# Patient Record
Sex: FEMALE | Race: WHITE | NOT HISPANIC OR LATINO | Employment: FULL TIME | URBAN - METROPOLITAN AREA
[De-identification: names, ages, dates, MRNs, and addresses within clinical notes are randomized per-mention and may not be internally consistent; named-entity substitution may affect disease eponyms.]

---

## 2022-11-21 ENCOUNTER — OFFICE VISIT (OUTPATIENT)
Dept: INTERNAL MEDICINE | Facility: CLINIC | Age: 32
End: 2022-11-21

## 2022-11-21 ENCOUNTER — LAB (OUTPATIENT)
Dept: LAB | Facility: HOSPITAL | Age: 32
End: 2022-11-21

## 2022-11-21 VITALS
DIASTOLIC BLOOD PRESSURE: 62 MMHG | OXYGEN SATURATION: 99 % | HEART RATE: 73 BPM | TEMPERATURE: 97.1 F | BODY MASS INDEX: 25.4 KG/M2 | WEIGHT: 138 LBS | HEIGHT: 62 IN | SYSTOLIC BLOOD PRESSURE: 110 MMHG

## 2022-11-21 DIAGNOSIS — Z23 NEED FOR VACCINATION: ICD-10-CM

## 2022-11-21 DIAGNOSIS — Z34.90 ENCOUNTER FOR PRENATAL CARE: Primary | ICD-10-CM

## 2022-11-21 DIAGNOSIS — Z00.00 HEALTHCARE MAINTENANCE: ICD-10-CM

## 2022-11-21 DIAGNOSIS — F34.1 PERSISTENT DEPRESSIVE DISORDER: ICD-10-CM

## 2022-11-21 DIAGNOSIS — Z01.419 WOMEN'S ANNUAL ROUTINE GYNECOLOGICAL EXAMINATION: ICD-10-CM

## 2022-11-21 DIAGNOSIS — R63.5 WEIGHT GAIN: ICD-10-CM

## 2022-11-21 DIAGNOSIS — Z23 NEED FOR INFLUENZA VACCINATION: ICD-10-CM

## 2022-11-21 LAB
DEPRECATED RDW RBC AUTO: 39 FL (ref 37–54)
ERYTHROCYTE [DISTWIDTH] IN BLOOD BY AUTOMATED COUNT: 12.5 % (ref 12.3–15.4)
HBA1C MFR BLD: 4.9 % (ref 4.8–5.6)
HCT VFR BLD AUTO: 42.6 % (ref 34–46.6)
HGB BLD-MCNC: 14.6 G/DL (ref 12–15.9)
MCH RBC QN AUTO: 29.4 PG (ref 26.6–33)
MCHC RBC AUTO-ENTMCNC: 34.3 G/DL (ref 31.5–35.7)
MCV RBC AUTO: 85.9 FL (ref 79–97)
PLATELET # BLD AUTO: 300 10*3/MM3 (ref 140–450)
PMV BLD AUTO: 9.7 FL (ref 6–12)
RBC # BLD AUTO: 4.96 10*6/MM3 (ref 3.77–5.28)
WBC NRBC COR # BLD: 7.84 10*3/MM3 (ref 3.4–10.8)

## 2022-11-21 PROCEDURE — 86803 HEPATITIS C AB TEST: CPT

## 2022-11-21 PROCEDURE — 99204 OFFICE O/P NEW MOD 45 MIN: CPT | Performed by: INTERNAL MEDICINE

## 2022-11-21 PROCEDURE — 83036 HEMOGLOBIN GLYCOSYLATED A1C: CPT

## 2022-11-21 PROCEDURE — 80050 GENERAL HEALTH PANEL: CPT

## 2022-11-21 PROCEDURE — 90471 IMMUNIZATION ADMIN: CPT | Performed by: INTERNAL MEDICINE

## 2022-11-21 PROCEDURE — 90686 IIV4 VACC NO PRSV 0.5 ML IM: CPT | Performed by: INTERNAL MEDICINE

## 2022-11-21 PROCEDURE — 84703 CHORIONIC GONADOTROPIN ASSAY: CPT

## 2022-11-21 PROCEDURE — 36415 COLL VENOUS BLD VENIPUNCTURE: CPT

## 2022-11-21 RX ORDER — DROSPIRENONE AND ETHINYL ESTRADIOL 0.02-3(28)
1 KIT ORAL DAILY
COMMUNITY
Start: 2022-08-15 | End: 2023-02-12

## 2022-11-21 RX ORDER — SERTRALINE HYDROCHLORIDE 100 MG/1
100 TABLET, FILM COATED ORAL DAILY
COMMUNITY
End: 2022-11-21 | Stop reason: SDUPTHER

## 2022-11-21 RX ORDER — SERTRALINE HYDROCHLORIDE 100 MG/1
100 TABLET, FILM COATED ORAL DAILY
Qty: 90 TABLET | Refills: 1 | Status: CANCELLED | OUTPATIENT
Start: 2022-11-21

## 2022-11-21 RX ORDER — SERTRALINE HYDROCHLORIDE 100 MG/1
100 TABLET, FILM COATED ORAL DAILY
Qty: 90 TABLET | Refills: 1 | Status: SHIPPED | OUTPATIENT
Start: 2022-11-21 | End: 2023-02-12

## 2022-11-21 NOTE — PROGRESS NOTES
New Patient Office Visit      Date: 2022   Patient Name: Magdalena Love  : 1990   MRN: [id number]     Chief Complaint:    Chief Complaint   Patient presents with   • Contraception     She has just stopped her birth control after being on it for 15 years.  She would like her hormones checked.   • Anxiety     Needs a refill on zoloft   • Depression   • Med Refill   • Establish Care     New patient   • weight gain     She has gained 10 lbs in the last 6 months.  She would like to have her thyroid checked.       History of Present Illness: Magdalena Bates is a 32 y.o. female who is here today to establish care.       She is recently  and her name is Magdalena Love    She and her  are ready to try to conceive. Patient took her last birth control pill last night.  She takes collagen daily and has prenatal vitamins at home that she has not started.  Her menstrual cycles have been very light for 5 years.  Patient is a  rep and also a [employer] .  Her normal weight has been 128 pounds, but at the beginning of 2022 she was 138 pounds with no change in her diet, and she works out 5 to 6 times per week. She has never gained that much weight.   She reports a family history of thyroid disease.     She started taking Zoloft in 2018 for depression and anxiety. Currently, she takes 100 mg for anxiety.     Her last Pap smear was probably 3 years ago. She would like a referral to an OB-GYN.   She reports having high cholesterol inherited from her father.  She is unsure if she has been screened for hepatitis C or HIV.   She had Lasix surgery done not too long ago.   Patient has not received her influenza vaccine this year and will get that today.    She is fully vaccinated for COVID-19. The patient declines the bivalent booster.   Patient thinks she had a tetanus vaccine last year and will check on that.     Subjective      Review of Systems:   Review of Systems  "  Constitutional: Positive for unexpected weight gain. Negative for activity change and appetite change.   HENT: Negative for dental problem.    Eyes: Negative for visual disturbance.   Genitourinary: Negative for menstrual problem.   Neurological: Negative for weakness.   Psychiatric/Behavioral: The patient is nervous/anxious.        Past Medical History:   Past Medical History:   Diagnosis Date   • Anxiety    • Depression    • Fainting        Past Surgical History:   Past Surgical History:   Procedure Laterality Date   • MASTOPEXY Bilateral 10/2015    [place], [place]   • WISDOM TOOTH EXTRACTION  2011    [place], [place]       Family History:   Family History   Problem Relation Age of Onset   • Hyperlipidemia Father    • Dementia Maternal Grandfather    • Epilepsy Paternal Grandmother        Social History:   Social History     Socioeconomic History   • Marital status:    Tobacco Use   • Smoking status: Never   • Smokeless tobacco: Never   Vaping Use   • Vaping Use: Never used   Substance and Sexual Activity   • Alcohol use: Yes     Alcohol/week: 1.0 standard drink     Types: 1 Standard drinks or equivalent per week   • Drug use: Never   • Sexual activity: Never       Medications:     Current Outpatient Medications:   •  drospirenone-ethinyl estradiol (JUANA,GIANVI) 3-0.02 MG per tablet, 1 tablet Daily., Disp: , Rfl:   •  sertraline (Zoloft) 100 MG tablet, Take 1 tablet by mouth Daily., Disp: 90 tablet, Rfl: 1    Allergies:   No Known Allergies    Objective     Physical Exam:  Vital Signs:   Vitals:    11/21/22 1056   BP: 110/62   BP Location: Right arm   Patient Position: Sitting   Pulse: 73   Temp: 97.1 °F (36.2 °C)   TempSrc: Infrared   SpO2: 99%   Weight: 62.6 kg (138 lb)   Height: 156.5 cm (61.6\")     Body mass index is 25.57 kg/m².  BMI is >= 25 and <30. (Overweight) The following options were offered after discussion;: pt is trying to get pregnant; weight loss is not encouraged       Physical Exam  Vitals and " nursing note reviewed.   Constitutional:       General: She is not in acute distress.     Appearance: Normal appearance. She is well-developed and well-groomed. She is not ill-appearing or toxic-appearing.   HENT:      Head: Normocephalic and atraumatic.      Right Ear: Tympanic membrane, ear canal and external ear normal.      Left Ear: Tympanic membrane, ear canal and external ear normal.      Nose: Nose normal.      Mouth/Throat:      Mouth: Mucous membranes are moist.      Pharynx: Oropharynx is clear.   Eyes:      Conjunctiva/sclera: Conjunctivae normal.      Pupils: Pupils are equal, round, and reactive to light.   Cardiovascular:      Rate and Rhythm: Normal rate and regular rhythm.      Heart sounds: Normal heart sounds.      Comments: Faint 1/6 TUAN  Pulmonary:      Effort: Pulmonary effort is normal. No respiratory distress.      Breath sounds: Normal breath sounds. No wheezing, rhonchi or rales.   Abdominal:      General: Bowel sounds are normal. There is no distension.      Palpations: Abdomen is soft.      Tenderness: There is no abdominal tenderness. There is no guarding or rebound.   Musculoskeletal:      Cervical back: Neck supple.   Lymphadenopathy:      Cervical: No cervical adenopathy.   Skin:     General: Skin is warm and dry.   Neurological:      General: No focal deficit present.      Mental Status: She is alert and oriented to person, place, and time. Mental status is at baseline.      Motor: No weakness.      Gait: Gait normal.      Deep Tendon Reflexes: Reflexes normal.   Psychiatric:         Attention and Perception: Attention normal.         Mood and Affect: Mood normal.         Behavior: Behavior normal. Behavior is cooperative.         Thought Content: Thought content normal.         Judgment: Judgment normal.         Procedures    Results:     Labs:   Hemoglobin A1C   Date Value Ref Range Status   04/20/2022 4.6 <5.7 % Final        No results found for this or any previous visit.      Imaging:   No Images in the past 120 days found..     Assessment / Plan      Assessment/Plan:   Diagnoses and all orders for this visit:    1. Encounter for prenatal care (Primary)    2. Weight gain  -     TSH Rfx On Abnormal To Free T4; Future    3. Women's annual routine gynecological examination  -     Ambulatory Referral to Obstetrics / Gynecology      4. Depression    5. Healthcare maintenance  -     TSH Rfx On Abnormal To Free T4; Future  -     Comprehensive Metabolic Panel; Future  -     Hemoglobin A1c; Future  -     CBC (No Diff); Future  -     Hepatitis C Antibody; Future  -     hCG, Serum, Qualitative; Future    6. Need for vaccination  -     FluLaval/Fluzone >6 mos ([id number])    7. Need for influenza vaccination    1. Prenatal care  - Patient trying to conceive   - counseled to start prenatal vitamins today and take daily while trying to conceive and while pregnant  - counseled to abstain from alcohol and maintain a healthy diet and exercise routine.   - Referral to OB-GYN for Pap and pregnancy.   - Counseled to discuss continuing sertraline while trying to conceive/pregnant with OB/GYN  - Check a pregnancy test with labs.     2. Unexpected weight gain  - counseled will check TSH     3. Faint 1/6 heart murmur   - Pt declines echo at this time; she has an intense exercise routine w/o any difficulties  - Patient had extensive workup as a teenager that was negative    4. Anxiety  - refilled sertraline 100mg qday  - counseled to discuss continuing sertraline while trying to conceive/pregnancy with OB/GYN     5. Preventive Healthcare   - Referral to OB-GYN for Pap smear.   - Mammograms start at age 40, unless there is a change in family history.   - Colon cancer screening at age 45 unless family history changes.  - influenza vaccine today   - Patient thinks she had a tetanus last year and will check on that.  - She is up to date on COVID-19 vaccines and declines the COVID-19 bivalent booster.          Follow Up:   Return in about 1 year (around 11/21/2023).    I have spent a total of 45 min on reviewing test results/preparing to see patient, counseling patient, performing medically appropriate exam and documenting clinical information in the electronic or other health record    Bebe Gleason MD. [facility]     Transcribed from ambient dictation for Bebe Gleason MD by Anay Rose.  11/21/22   13:50 EST    Patient verbalized consent to the visit recording.  I have personally performed the services described in this document as transcribed by the above individual, and it is both accurate and complete.

## 2022-11-22 LAB
ALBUMIN SERPL-MCNC: 4.3 G/DL (ref 3.5–5.2)
ALBUMIN/GLOB SERPL: 1.4 G/DL
ALP SERPL-CCNC: 56 U/L (ref 39–117)
ALT SERPL W P-5'-P-CCNC: 19 U/L (ref 1–33)
ANION GAP SERPL CALCULATED.3IONS-SCNC: 11.5 MMOL/L (ref 5–15)
AST SERPL-CCNC: 23 U/L (ref 1–32)
BILIRUB SERPL-MCNC: <0.2 MG/DL (ref 0–1.2)
BUN SERPL-MCNC: 16 MG/DL (ref 6–20)
BUN/CREAT SERPL: 22.9 (ref 7–25)
CALCIUM SPEC-SCNC: 9.7 MG/DL (ref 8.6–10.5)
CHLORIDE SERPL-SCNC: 102 MMOL/L (ref 98–107)
CO2 SERPL-SCNC: 23.5 MMOL/L (ref 22–29)
CREAT SERPL-MCNC: 0.7 MG/DL (ref 0.57–1)
EGFRCR SERPLBLD CKD-EPI 2021: 119.5 ML/MIN/1.73
GLOBULIN UR ELPH-MCNC: 3 GM/DL
GLUCOSE SERPL-MCNC: 79 MG/DL (ref 65–99)
HCG SERPL QL: NEGATIVE
HCV AB SER DONR QL: NORMAL
POTASSIUM SERPL-SCNC: 4.1 MMOL/L (ref 3.5–5.2)
PROT SERPL-MCNC: 7.3 G/DL (ref 6–8.5)
SODIUM SERPL-SCNC: 137 MMOL/L (ref 136–145)
TSH SERPL DL<=0.05 MIU/L-ACNC: 1.72 UIU/ML (ref 0.27–4.2)

## 2022-11-23 ENCOUNTER — PATIENT ROUNDING (BHMG ONLY) (OUTPATIENT)
Dept: INTERNAL MEDICINE | Facility: CLINIC | Age: 32
End: 2022-11-23

## 2022-11-23 NOTE — PROGRESS NOTES
A Backplane message has been sent to the patient for patient rounding with [facility].

## 2022-11-23 NOTE — PROGRESS NOTES
Ms. Love,    It was nice to meet you this week.    All of your labs checked on 11/21/22 are normal, including your thyroid lab test.     Thank you,  Dr. Gleason

## 2023-02-12 ENCOUNTER — TELEMEDICINE (OUTPATIENT)
Dept: FAMILY MEDICINE CLINIC | Facility: TELEHEALTH | Age: 33
End: 2023-02-12
Payer: COMMERCIAL

## 2023-02-12 DIAGNOSIS — J06.9 VIRAL UPPER RESPIRATORY TRACT INFECTION: Primary | ICD-10-CM

## 2023-02-12 PROBLEM — F32.9 REACTIVE DEPRESSION: Status: ACTIVE | Noted: 2019-03-28

## 2023-02-12 PROBLEM — F41.9 ANXIETY: Status: ACTIVE | Noted: 2022-04-20

## 2023-02-12 PROBLEM — L29.2 VULVAR ITCHING: Status: ACTIVE | Noted: 2018-07-12

## 2023-02-12 PROBLEM — Z01.419 WELL WOMAN EXAM WITH ROUTINE GYNECOLOGICAL EXAM: Status: ACTIVE | Noted: 2019-03-28

## 2023-02-12 PROCEDURE — 99213 OFFICE O/P EST LOW 20 MIN: CPT | Performed by: NURSE PRACTITIONER

## 2023-02-12 RX ORDER — METHYLPREDNISOLONE 4 MG/1
TABLET ORAL
Qty: 21 TABLET | Refills: 0 | Status: SHIPPED | OUTPATIENT
Start: 2023-02-12

## 2023-02-12 NOTE — PROGRESS NOTES
CHIEF COMPLAINT  Chief Complaint   Patient presents with   • Sinusitis   • Nasal Congestion   • Cough         HPI  Magdalena Love is a 32 y.o. female  presents with complaint of head congestion and sinus pressure since Thursday 2/9/2023.   Taking Dayquil and Nyquil along with sudafed. Ask about Mucinex D    Review of Systems   Constitutional: Positive for fatigue. Negative for chills, diaphoresis and fever.   HENT: Positive for congestion, postnasal drip, rhinorrhea and sinus pressure.    Respiratory: Positive for cough. Negative for chest tightness, shortness of breath and wheezing.    Cardiovascular: Negative for chest pain.   Gastrointestinal: Negative for diarrhea, nausea and vomiting.   Musculoskeletal: Negative for myalgias.   Neurological: Positive for headaches.       Past Medical History:   Diagnosis Date   • Anxiety    • Depression    • Fainting        Family History   Problem Relation Age of Onset   • Hyperlipidemia Father    • Dementia Maternal Grandfather    • Epilepsy Paternal Grandmother        Social History     Socioeconomic History   • Marital status:    Tobacco Use   • Smoking status: Never     Passive exposure: Never   • Smokeless tobacco: Never   Vaping Use   • Vaping Use: Never used   Substance and Sexual Activity   • Alcohol use: Yes     Alcohol/week: 1.0 standard drink     Types: 1 Standard drinks or equivalent per week   • Drug use: Never   • Sexual activity: Never       Magdalena Love  reports that she has never smoked. She has never been exposed to tobacco smoke. She has never used smokeless tobacco.       LMP 02/06/2023 (Approximate)   Breastfeeding No     PHYSICAL EXAM  Physical Exam   Constitutional: She is oriented to person, place, and time. She appears well-developed and well-nourished. She does not have a sickly appearance. She does not appear ill. No distress.   HENT:   Head: Normocephalic and atraumatic.   Nose: Rhinorrhea and congestion present.   Eyes: EOM are normal.    Neck: Neck normal appearance.  Pulmonary/Chest: Effort normal.  No respiratory distress.  Neurological: She is alert and oriented to person, place, and time.   Skin: Skin is dry.   Psychiatric: She has a normal mood and affect.         Diagnoses and all orders for this visit:    1. Viral upper respiratory tract infection (Primary)    Discussed likely viral and antibiotics are not effective treatment   Rest, fluids and over the counter medications are okay.   Stop Sudafed if taking Dayquil and Nyquil.   Do not start Muxinex D or fastmax or multisymptom relief if taking Dayquil and Nyquil, but can take Mucinex DM if cough worsens   Recommend COVID-19 testing.     The use of a video visit has been reviewed with the patient and verbal informed consent has been obtained. Myself and Magdalena Love participated in this visit. The patient is located in [address] [place] [address]. I am located in [place], [place]. Mychart and Twilio were utilized.       Note Disclaimer: At [facility], we believe that sharing information builds trust and better   relationships. You are receiving this note because you recently visited [facility]. It is possible you   will see health information before a provider has talked with you about it. This kind of information can   be easy to misunderstand. To help you fully understand what it means for your health, we urge you to   discuss this note with your provider.    Harriet Marquez, DIXON  02/12/2023  09:52 EST

## 2023-02-12 NOTE — PATIENT INSTRUCTIONS
Drink plenty of water  Over the counter pain relievers okay   If symptoms do not improve in 5-7 days follow up with your primary care provider or urgent care  Discussed likely viral and antibiotics are not effective treatment   Rest, fluids and over the counter medications are okay.   Discussed likely viral and antibiotics are not effective treatment   Rest, fluids and over the counter medications are okay.   Stop Sudafed if taking Dayquil and Nyquil.   Do not start Muxinex D or fastmax or multisymptom relief if taking Dayquil and Nyquil, but can take Mucinex DM for cough   Recommend COVID-19 testing.     Upper Respiratory Infection, Adult  An upper respiratory infection (URI) is a common viral infection of the nose, throat, and upper air passages that lead to the lungs. The most common type of URI is the common cold. URIs usually get better on their own, without medical treatment.  What are the causes?  A URI is caused by a virus. You may catch a virus by:  Breathing in droplets from an infected person's cough or sneeze.  Touching something that has been exposed to the virus (is contaminated) and then touching your mouth, nose, or eyes.  What increases the risk?  You are more likely to get a URI if:  You are very young or very old.  You have close contact with others, such as at work, school, or a health care facility.  You smoke.  You have long-term (chronic) heart or lung disease.  You have a weakened disease-fighting system (immune system).  You have nasal allergies or asthma.  You are experiencing a lot of stress.  You have poor nutrition.  What are the signs or symptoms?  A URI usually involves some of the following symptoms:  Runny or stuffy (congested) nose.  Cough.  Sneezing.  Sore throat.  Headache.  Fatigue.  Fever.  Loss of appetite.  Pain in your forehead, behind your eyes, and over your cheekbones (sinus pain).  Muscle aches.  Redness or irritation of the eyes.  Pressure in the ears or face.  How is  this diagnosed?  This condition may be diagnosed based on your medical history and symptoms, and a physical exam. Your health care provider may use a swab to take a mucus sample from your nose (nasal swab). This sample can be tested to determine what virus is causing the illness.  How is this treated?  URIs usually get better on their own within 7-10 days. Medicines cannot cure URIs, but your health care provider may recommend certain medicines to help relieve symptoms, such as:  Over-the-counter cold medicines.  Cough suppressants. Coughing is a type of defense against infection that helps to clear the respiratory system, so take these medicines only as recommended by your health care provider.  Fever-reducing medicines.  Follow these instructions at home:  Activity  Rest as needed.  If you have a fever, stay home from work or school until your fever is gone or until your health care provider says your URI cannot spread to other people (is no longer contagious). Your health care provider may have you wear a face mask to prevent your infection from spreading.  Relieving symptoms  Gargle with a mixture of salt and water 3-4 times a day or as needed. To make salt water, completely dissolve ½-1 tsp (3-6 g) of salt in 1 cup (237 mL) of warm water.  Use a cool-mist humidifier to add moisture to the air. This can help you breathe more easily.  Eating and drinking    Drink enough fluid to keep your urine pale yellow.  Eat soups and other clear broths.  General instructions    Take over-the-counter and prescription medicines only as told by your health care provider. These include cold medicines, fever reducers, and cough suppressants.  Do not use any products that contain nicotine or tobacco. These products include cigarettes, chewing tobacco, and vaping devices, such as e-cigarettes. If you need help quitting, ask your health care provider.  Stay away from secondhand smoke.  Stay up to date on all immunizations, including  the yearly (annual) flu vaccine.  Keep all follow-up visits. This is important.  How to prevent the spread of infection to others  URIs can be contagious. To prevent the infection from spreading:  Wash your hands with soap and water for at least 20 seconds. If soap and water are not available, use hand .  Avoid touching your mouth, face, eyes, or nose.  Cough or sneeze into a tissue or your sleeve or elbow instead of into your hand or into the air.    Contact a health care provider if:  You are getting worse instead of better.  You have a fever or chills.  Your mucus is brown or red.  You have yellow or brown discharge coming from your nose.  You have pain in your face, especially when you bend forward.  You have swollen neck glands.  You have pain while swallowing.  You have white areas in the back of your throat.  Get help right away if:  You have shortness of breath that gets worse.  You have severe or persistent:  Headache.  Ear pain.  Sinus pain.  Chest pain.  You have chronic lung disease along with any of the following:  Making high-pitched whistling sounds when you breathe, most often when you breathe out (wheezing).  Prolonged cough (more than 14 days).  Coughing up blood.  A change in your usual mucus.  You have a stiff neck.  You have changes in your:  Vision.  Hearing.  Thinking.  Mood.  These symptoms may be an emergency. Get help right away. Call 911.  Do not wait to see if the symptoms will go away.  Do not drive yourself to the hospital.  Summary  An upper respiratory infection (URI) is a common infection of the nose, throat, and upper air passages that lead to the lungs.  A URI is caused by a virus.  URIs usually get better on their own within 7-10 days.  Medicines cannot cure URIs, but your health care provider may recommend certain medicines to help relieve symptoms.  This information is not intended to replace advice given to you by your health care provider. Make sure you discuss any  questions you have with your health care provider.  Document Revised: 07/20/2022 Document Reviewed: 07/20/2022  Elsevier Patient Education © 2022 [employer]

## 2023-04-27 ENCOUNTER — PATIENT ROUNDING (BHMG ONLY) (OUTPATIENT)
Dept: OBSTETRICS AND GYNECOLOGY | Facility: CLINIC | Age: 33
End: 2023-04-27
Payer: COMMERCIAL

## 2023-04-27 ENCOUNTER — OFFICE VISIT (OUTPATIENT)
Dept: OBSTETRICS AND GYNECOLOGY | Facility: CLINIC | Age: 33
End: 2023-04-27
Payer: COMMERCIAL

## 2023-04-27 VITALS
SYSTOLIC BLOOD PRESSURE: 110 MMHG | HEIGHT: 62 IN | BODY MASS INDEX: 25.98 KG/M2 | DIASTOLIC BLOOD PRESSURE: 68 MMHG | WEIGHT: 141.2 LBS

## 2023-04-27 DIAGNOSIS — L65.9 HAIR LOSS: ICD-10-CM

## 2023-04-27 DIAGNOSIS — Z01.419 WELL WOMAN EXAM WITH ROUTINE GYNECOLOGICAL EXAM: Primary | ICD-10-CM

## 2023-04-27 RX ORDER — PRENATAL VIT NO.126/IRON/FOLIC 28MG-0.8MG
TABLET ORAL DAILY
COMMUNITY

## 2023-04-27 NOTE — PROGRESS NOTES
Gynecologic Annual Exam Note        Gynecologic Exam        Subjective     HPI  Magdalena Love is a 32 y.o. No obstetric history on file. female who presents for annual well woman exam as a new patient.Patient's last menstrual period was 04/01/2023 (exact date).. Her periods occur every 25-35 days , lasting 6 days. The flow is moderate to heavy.. She reports dysmenorrhea is none. Partner Status: Marital Status: .  She is sexually active. She has not had new partners.. STD testing recommendations have been explained to the patient and she does not desire STD testing.    Pt has been trying to conceive since Nov 2022. Pt stopped her BC pills that she was taking for about 16 years since beginning of Nov2022.     Additional OB/GYN History   Current contraception: contraceptive methods: None  Desires to: do not start contraception  Thromboembolic Disease: none    History of STD: no    Last Pap : per pt over 2 years . Results: negative. HPV: unknown .   Last Completed Pap Smear     This patient has no relevant Health Maintenance data.           History of abnormal Pap smear: no  Gardasil status:completed  Family history of uterine, colon, breast, or ovarian cancer: no  Performs monthly Self-Breast Exam: no  Exercises Regularly:yes  Feelings of Anxiety or Depression: no  Tobacco Usage?: No       Current Outpatient Medications:   •  prenatal vitamin (prenatal, CLASSIC, vitamin) tablet, Take  by mouth Daily., Disp: , Rfl:      Patient denies the need for medication refills today.    OB History    No obstetric history on file.         Health Maintenance   Topic Date Due   • Annual Gynecologic Pelvic and Breast Exam  Never done   • COVID-19 Vaccine (3 - Booster for Moderna series) 03/31/2021   • ANNUAL PHYSICAL  Never done   • PAP SMEAR  Never done   • LIPID PANEL  04/27/2023   • INFLUENZA VACCINE  08/01/2023   • TDAP/TD VACCINES (2 - Td or Tdap) 02/24/2030   • HEPATITIS C SCREENING  Completed   • Pneumococcal  "Vaccine 0-64  Aged Out       Past Medical History:   Diagnosis Date   • Anxiety    • Depression    • Fainting    • Hyperlipidemia     Family history        Past Surgical History:   Procedure Laterality Date   • MASTOPEXY Bilateral 10/2015    [place], [place]   • REFRACTIVE SURGERY     • WISDOM TOOTH EXTRACTION  2011    [place], [place]       The additional following portions of the patient's history were reviewed and updated as appropriate: allergies, current medications, past family history, past medical history, past social history, past surgical history and problem list.    Review of Systems   Constitutional: Negative.    HENT: Negative.         Hair loss   Eyes: Negative.    Respiratory: Negative.    Cardiovascular: Negative.    Gastrointestinal: Negative.    Endocrine: Negative.    Genitourinary: Negative.    Musculoskeletal: Negative.    Skin: Negative.    Allergic/Immunologic: Negative.    Neurological: Negative.    Hematological: Negative.    Psychiatric/Behavioral: Negative.          I have reviewed and agree with the HPI, ROS, and historical information as entered above. Shirin Ross MD        Objective   /68   Ht 156.5 cm (61.6\")   Wt 64 kg (141 lb 3.2 oz)   LMP 04/01/2023 (Exact Date)   BMI 26.16 kg/m²     Physical Exam  Vitals and nursing note reviewed. Exam conducted with a chaperone present.   Constitutional:       Appearance: She is well-developed.   HENT:      Head: Normocephalic and atraumatic.   Neck:      Thyroid: No thyroid mass or thyromegaly.   Cardiovascular:      Rate and Rhythm: Normal rate and regular rhythm.      Heart sounds: No murmur heard.  Pulmonary:      Effort: Pulmonary effort is normal. No retractions.      Breath sounds: Normal breath sounds. No wheezing, rhonchi or rales.   Chest:      Chest wall: No mass or tenderness.   Breasts:     Right: Normal. No mass, nipple discharge, skin change or tenderness.      Left: Normal. No mass, nipple discharge, skin change or " tenderness.      Comments: Reduction scars noted  Abdominal:      General: Bowel sounds are normal.      Palpations: Abdomen is soft. Abdomen is not rigid. There is no mass.      Tenderness: There is no abdominal tenderness. There is no guarding.      Hernia: No hernia is present. There is no hernia in the left inguinal area or right inguinal area.   Genitourinary:     General: Normal vulva.      Exam position: Lithotomy position.      Pubic Area: No rash.       Labia:         Right: No rash, tenderness or lesion.         Left: No rash, tenderness or lesion.       Urethra: No urethral pain or urethral swelling.      Vagina: Normal. No vaginal discharge or lesions.      Cervix: No cervical motion tenderness, discharge, lesion or cervical bleeding.      Uterus: Normal. Not enlarged, not fixed and not tender.       Adnexa:         Right: No mass, tenderness or fullness.          Left: No mass, tenderness or fullness.        Rectum: No external hemorrhoid.   Musculoskeletal:      Cervical back: Normal range of motion. No muscular tenderness.   Neurological:      Mental Status: She is alert and oriented to person, place, and time.   Psychiatric:         Behavior: Behavior normal.            Assessment and Plan    Problem List Items Addressed This Visit        Health Encounters    Well woman exam with routine gynecological exam - Primary    Overview     Discussed current  ASCCP pap guidelines  Encourage folic acid, dietary calcium, exercise, yearly flu shot & breast awareness   RTO 1 year or PRN  Recommend pt establish My Chart & check any results there  Declines STI screen         Relevant Orders    LIQUID-BASED PAP SMEAR WITH HPV GENOTYPING REGARDLESS OF INTERPRETATION (RAZIA,COR,MAD)   Other Visit Diagnoses     Hair loss        Relevant Orders    TSH    Testosterone Free Direct          1. GYN annual well woman exam.   2. Reviewed pap guidelines.   3. Discussed preconception counseling.  Patient having regular cycles.   We discussed timed intercourse.  She is on prenatal vitamins.  She has been trying for approximately 5 to 6 months.  4. Hair loss noted since January.  She reports the roots are thick but she is having breakage of hair.  We discussed avoidance of processing hair and we will check TSH and testosterone today.  5. Recommended use of Vitamin D replacement and getting adequate calcium in her diet. (1500mg)  6. Reviewed monthly self breast exams.  Instructed to call with lumps, pain, or breast discharge.    7. Reviewed BMI and weight loss as preventative health measures.   8. Reviewed exercise as a preventative health measures.   9. Reccommended Flu Vaccine in Fall of each year.  10. RTC in 1 year or PRN with problems  Return in about 1 year (around 4/27/2024) for Annual physical.      Shirin Ross MD  04/27/2023

## 2023-04-27 NOTE — PROGRESS NOTES
April 27, 2023    Hello, may I speak with Magdalena Love?    My name is denys     I am  with [facility]   [facility] [address]  [address]  [facility] [address] [phone].    Before we get started may I verify your date of birth? 1990    I am calling to officially welcome you to our practice and ask about your recent visit. Is this a good time to talk? yes    Tell me about your visit with us. What things went well?  VERY IMPRESSED DR VALERO WAS GREAT AND EVERYTHING WAS SMOOTH WAS IN AND OUT        We're always looking for ways to make our patients' experiences even better. Do you have recommendations on ways we may improve?  no    Overall were you satisfied with your first visit to our practice? yes       I appreciate you taking the time to speak with me today. Is there anything else I can do for you? no      Thank you, and have a great day.

## 2023-04-28 LAB
REF LAB TEST METHOD: NORMAL
TESTOST FREE SERPL-MCNC: 1.2 PG/ML (ref 0–4.2)
TSH SERPL DL<=0.005 MIU/L-ACNC: 1.83 UIU/ML (ref 0.27–4.2)

## 2023-06-02 ENCOUNTER — TELEPHONE (OUTPATIENT)
Dept: OBSTETRICS AND GYNECOLOGY | Facility: CLINIC | Age: 33
End: 2023-06-02

## 2023-06-02 NOTE — TELEPHONE ENCOUNTER
Caller: Magdalena Love    Relationship: Self    Best call back number: [phone]    What is the best time to reach you: ANYTIME - LVM    Who are you requesting to speak with (clinical staff, provider,  specific staff member): DR. VALERO OR STAFF    Is it okay if the provider responds through [employer]: YES    PT IS A NEW OB PT - FIRST VISIT IS SCHEDULED FOR 7/12/23 - PT IS FLYING TO [place], [place] ON 06/21/23 - PT IS ASKING IF IT IS SAFE FOR HER TO FLY - PLEASE CALL THE PT TO LET HER KNOW    THANK YOU!

## 2023-06-02 NOTE — TELEPHONE ENCOUNTER
LMP 4/28/23. Pt states she has a flight on 6/21/23 to [place]. Educated pt there are no travel restrictions in early pregnancy, pt JASEN.

## 2023-07-12 PROBLEM — F32.9 REACTIVE DEPRESSION: Status: RESOLVED | Noted: 2019-03-28 | Resolved: 2023-07-12

## 2023-07-12 PROBLEM — F32.A ANXIETY AND DEPRESSION: Status: ACTIVE | Noted: 2022-04-20

## 2023-07-12 PROBLEM — Z01.419 WELL WOMAN EXAM WITH ROUTINE GYNECOLOGICAL EXAM: Status: RESOLVED | Noted: 2019-03-28 | Resolved: 2023-07-12

## 2023-07-12 PROBLEM — L29.2 VULVAR ITCHING: Status: RESOLVED | Noted: 2018-07-12 | Resolved: 2023-07-12

## 2023-07-12 PROBLEM — Z34.90 PRENATAL CARE, ANTEPARTUM: Status: ACTIVE | Noted: 2023-07-12

## 2023-08-08 PROBLEM — Z28.39 RUBELLA NON-IMMUNE STATUS, ANTEPARTUM: Status: ACTIVE | Noted: 2023-08-08

## 2023-08-08 PROBLEM — O09.899 RUBELLA NON-IMMUNE STATUS, ANTEPARTUM: Status: ACTIVE | Noted: 2023-08-08

## 2023-08-09 ENCOUNTER — ROUTINE PRENATAL (OUTPATIENT)
Dept: OBSTETRICS AND GYNECOLOGY | Facility: CLINIC | Age: 33
End: 2023-08-09
Payer: COMMERCIAL

## 2023-08-09 VITALS — DIASTOLIC BLOOD PRESSURE: 62 MMHG | WEIGHT: 145 LBS | SYSTOLIC BLOOD PRESSURE: 102 MMHG | BODY MASS INDEX: 26.87 KG/M2

## 2023-08-09 DIAGNOSIS — F41.9 ANXIETY AND DEPRESSION: ICD-10-CM

## 2023-08-09 DIAGNOSIS — F32.A ANXIETY AND DEPRESSION: ICD-10-CM

## 2023-08-09 DIAGNOSIS — Z34.90 PRENATAL CARE, ANTEPARTUM: Primary | ICD-10-CM

## 2023-08-09 DIAGNOSIS — Z28.39 RUBELLA NON-IMMUNE STATUS, ANTEPARTUM: ICD-10-CM

## 2023-08-09 DIAGNOSIS — O09.899 RUBELLA NON-IMMUNE STATUS, ANTEPARTUM: ICD-10-CM

## 2023-08-09 LAB
GLUCOSE UR STRIP-MCNC: NEGATIVE MG/DL
PROT UR STRIP-MCNC: NEGATIVE MG/DL

## 2023-08-09 NOTE — PROGRESS NOTES
OB FOLLOW UP  CC- Here for care of pregnancy        Magdalena Love is a 33 y.o.  13w3d patient being seen today for her obstetrical follow up visit. Patient reports heartburn (She is not taking OTC medication for treatment currently).     Her prenatal care is complicated by (and status) :   Patient Active Problem List   Diagnosis    Anxiety and depression    Prenatal care, antepartum    Rubella non-immune status, antepartum       Desires genetic testing?:  already done, neg, boy  Ultrasound Today: No    ROS -   Patient Denies: Loss of Fluid, Vaginal Spotting, Vision Changes, Headaches, Nausea , and Vomiting   Fetal Movement :  N/A  All other systems reviewed and are negative.     The additional following portions of the patient's history were reviewed and updated as appropriate: allergies, current medications, past family history, past medical history, past social history, past surgical history, and problem list.    I have reviewed and agree with the HPI, ROS, and historical information as entered above. Shirin Ross MD          /62   Wt 65.8 kg (145 lb)   LMP 2023   BMI 26.87 kg/mý         EXAM:     Prenatal Vitals  BP: 102/62  Weight: 65.8 kg (145 lb)   Fetal Heart Rate: 147          Urine Glucose Read-only: Negative  Urine Protein Read-only: Negative       Assessment and Plan    Problem List Items Addressed This Visit          Gravid and     Prenatal care, antepartum - Primary    Overview     CF DNA testing negative.  Boy!         Relevant Orders    POC Urinalysis Dipstick (Completed)    Rubella non-immune status, antepartum       Mental Health    Anxiety and depression    Overview     Controlled off meds at new OB visit.            Pregnancy at 13w3d  Labs reviewed from New OB Visit.  Counseled on genetic testing, carrier status and option for NT screen  Activity and Exercise discussed.  Patient is on Prenatal vitamins  Return in about 4 weeks (around  9/6/2023).    Shirin Ross MD  08/09/2023

## 2023-09-07 ENCOUNTER — ROUTINE PRENATAL (OUTPATIENT)
Dept: OBSTETRICS AND GYNECOLOGY | Facility: CLINIC | Age: 33
End: 2023-09-07
Payer: COMMERCIAL

## 2023-09-07 VITALS — WEIGHT: 146 LBS | SYSTOLIC BLOOD PRESSURE: 110 MMHG | DIASTOLIC BLOOD PRESSURE: 68 MMHG | BODY MASS INDEX: 27.05 KG/M2

## 2023-09-07 DIAGNOSIS — O09.899 RUBELLA NON-IMMUNE STATUS, ANTEPARTUM: ICD-10-CM

## 2023-09-07 DIAGNOSIS — M54.30 SCIATICA, UNSPECIFIED LATERALITY: ICD-10-CM

## 2023-09-07 DIAGNOSIS — Z34.90 PRENATAL CARE, ANTEPARTUM: Primary | ICD-10-CM

## 2023-09-07 DIAGNOSIS — F41.9 ANXIETY AND DEPRESSION: ICD-10-CM

## 2023-09-07 DIAGNOSIS — Z28.39 RUBELLA NON-IMMUNE STATUS, ANTEPARTUM: ICD-10-CM

## 2023-09-07 DIAGNOSIS — F32.A ANXIETY AND DEPRESSION: ICD-10-CM

## 2023-09-07 LAB
GLUCOSE UR STRIP-MCNC: NEGATIVE MG/DL
PROT UR STRIP-MCNC: NEGATIVE MG/DL

## 2023-09-07 NOTE — PROGRESS NOTES
"      OB FOLLOW UP  CC- Here for care of pregnancy        Magdalena Love is a 33 y.o.  17w4d patient being seen today for her obstetrical follow up visit. Patient reports possible sciatica pain in RT buttocks area that radiates down RT leg. She states \"feels like a sharp pinch, catch, then runs down leg\" then the pain goes away. This pain occurs intermittently when she squats or bends over. Pt reports using a foam roller and stretching to try to relieve the pain.     Her prenatal care is complicated by (and status) :   Patient Active Problem List   Diagnosis    Anxiety and depression    Prenatal care, antepartum    Rubella non-immune status, antepartum         Ultrasound Today: No    AFP: declines    ROS -   Patient Reports :  possible sciatic pain she also reports increase in anxiety and depression.  Patient Denies: Loss of Fluid, Vaginal Spotting, Vision Changes, Headaches, Nausea , and Vomiting   Fetal Movement : absent  All other systems reviewed and are negative.       The additional following portions of the patient's history were reviewed and updated as appropriate: allergies, current medications, past family history, past medical history, past social history, past surgical history, and problem list.      I have reviewed and agree with the HPI, ROS, and historical information as entered above. Shirin Ross MD          EXAM:     Prenatal Vitals  BP: 110/68  Weight: 66.2 kg (146 lb)       Pelvic Exam:        Urine Glucose Read-only: Negative  Urine Protein Read-only: Negative           Assessment and Plan    Problem List Items Addressed This Visit          Gravid and     Prenatal care, antepartum - Primary    Overview     CF DNA testing negative.  Boy!         Relevant Orders    US Ob 14 + Weeks Single or First Gestation    POC Urinalysis Dipstick (Completed)    Rubella non-immune status, antepartum       Mental Health    Anxiety and depression    Overview     Zoloft restarted at 17 weeks " due to increasing anxiety and crying spells.         Relevant Medications    sertraline (Zoloft) 50 MG tablet     Other Visit Diagnoses       Sciatica, unspecified laterality                Pregnancy at 17w4d  Fetal status reassuring.   Zoloft started today secondary to worsening anxiety and depression.  Patient treated with this in the past.  Physical therapy consult for sciatic pain.  Counseled on MSAFP alone in relation to OTD and placental issues.    Anatomy scan next visit.   Activity and Exercise discussed.  Patient is on Prenatal vitamins  Return in about 3 weeks (around 9/28/2023) for anatomy usg.    Shirin Ross MD  09/07/2023

## 2023-09-14 ENCOUNTER — TREATMENT (OUTPATIENT)
Dept: PHYSICAL THERAPY | Facility: CLINIC | Age: 33
End: 2023-09-14
Payer: COMMERCIAL

## 2023-09-14 DIAGNOSIS — M54.41 ACUTE RIGHT-SIDED LOW BACK PAIN WITH RIGHT-SIDED SCIATICA: Primary | ICD-10-CM

## 2023-09-14 NOTE — PROGRESS NOTES
Physical Therapy Initial Evaluation and Plan of Care  [address] [place] [address]      Patient: Magdalena Love   : 1990  Diagnosis/ICD-10 Code:  Acute right-sided low back pain with right-sided sciatica [M54.41]  Referring practitioner: Shirin Ross MD  Date of Initial Visit: 2023  Today's Date: 2023  Patient seen for 1 session         Visit Diagnoses:    ICD-10-CM ICD-9-CM   1. Acute right-sided low back pain with right-sided sciatica  M54.41 724.2     724.3     Oswestry     Subjective Evaluation    History of Present Illness    Subjective comment: Pt is 19 weeks pregnant and pain started about 2 weeks ago. Pain when she bends over in the R buttock and then down the back of the R leg to the mid thigh. Pt works out 5-6 days a week stretching, foam rolling, spin bike and walking.  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[employer], drives a lot Pain  At best pain ratin  At worst pain rating: 3  Location: right burrock and thigh  Quality: sharp and dull ache (tingling)  Relieving factors: change in position  Aggravating factors: lifting, ambulation, standing and movement  Progression: worsening    Social Support  Lives in: multiple-level home  Lives with: spouse    Diagnostic Tests  No diagnostic tests performed    Treatments  No previous or current treatments  Patient Goals  Patient goals for therapy: decreased pain, increased motion, increased strength and return to sport/leisure activities           Objective          Postural Observations  Seated posture: good  Standing posture: good      Palpation     Right   Hypertonic in the iliopsoas, lumbar paraspinals and quadratus lumborum.     Additional Palpation Details  Pain in R piriformis    Neurological Testing     Sensation     Lumbar   Left   Intact: light touch    Right   Intact: light touch    Reflexes   Left   Patellar (L4): normal (2+)  Achilles (S1): normal (2+)    Right   Patellar (L4): normal (2+)  Achilles  (S1): normal (2+)    Active Range of Motion     Lumbar   Normal active range of motion    Strength/Myotome Testing     Left Hip   Planes of Motion   Flexion: 4+  Abduction: 4-    Right Hip   Planes of Motion   Flexion: 4+  Abduction: 4-    Left Knee   Flexion: 5  Extension: 5    Right Knee   Flexion: 5  Extension: 5    Left Ankle/Foot   Dorsiflexion: 5  Plantar flexion: 5    Right Ankle/Foot   Dorsiflexion: 5  Plantar flexion: 5    Tests       Thoracic   Positive slump.     Lumbar   Positive repeated flexion.     Right   Positive passive SLR.     Right Pelvic Girdle/Sacrum   Positive: sacrum compression and gapping.     Right Hip   Positive Gaenslen's.     Ambulation     Observational Gait   Gait: within functional limits     Additional Observational Gait Details  Slight increase in B hip ER, slight decreased hip extension B        Assessment & Plan       Assessment  Impairments: abnormal muscle firing, abnormal muscle tone, abnormal or restricted ROM, activity intolerance, impaired physical strength and pain with function   Functional limitations: walking and stooping   Assessment details: Pt is a 34 YO F who presents to the clinic with report of low back and R posterior hip pain. Pt with signs and symptoms consistent with pelvic girdle pain and instability. Pt will benefit from skilled PT for improving core muscle strength and stability to aid with daily demands.  Barriers to therapy: pregnancy  Prognosis: good    Goals  Plan Goals: SHORT TERM GOALS:     4 weeks  1. Pt independent with HEP  2. Pt to demonstrate trunk AROM without increased pain to allow for improved ability to perform ADL's  3. Pt to demonstrate bilateral hip strength abd to 4/5 to improve stability of the core/trunk     LONG TERM GOALS:   8 weeks  1. Pt to demonstrate B hip abd strength of 4+/5 to aid with pelvic stability.  2. Pt to demonstrate ability to perform full functional squat with good form and without increased pain in the low back    3. Pt to report being able to work full shift and to work in the home without increase in pain in the back  4. Pt to report cessation of pain/numbness/tingling into the right leg to show decreased nerve compression       Plan  Therapy options: will be seen for skilled therapy services  Planned modality interventions: cryotherapy and thermotherapy (hydrocollator packs)  Planned therapy interventions: abdominal trunk stabilization, flexibility, functional ROM exercises, gait training, home exercise program, joint mobilization, manual therapy, neuromuscular re-education, postural training, soft tissue mobilization, spinal/joint mobilization, strengthening, stretching and therapeutic activities  Frequency: 2x week  Duration in weeks: 8  Treatment plan discussed with: patient      History # of Personal Factors and/or Comorbidities: LOW (0)  Examination of Body System(s): # of elements: LOW (1-2)  Clinical Presentation: STABLE   Clinical Decision Making: LOW       Timed:         Manual Therapy:         mins  77130;     Therapeutic Exercise:   14      mins  74316;     Neuromuscular Beau:       mins  58345;    Therapeutic Activity:          mins  42181;     Gait Training:          mins  45742;     Ultrasound:          mins  52286;    Ionto                                   mins   75803  Self Care                            mins   28021  Canalith Repos         mins 87049      Un-Timed:  Electrical Stimulation:         mins  03358 ( );  Dry Needling          mins self-pay  Traction          mins 31243  Low Eval     32    Mins  46660  Mod Eval          Mins  40698  High Eval                            Mins  23054        Timed Treatment:   14   mins   Total Treatment:     46   mins      PT: Lupe Hawley PT     License Number: [id number]    Electronically signed by Lupe Hawley PT, 09/14/23, 7:31 PM EDT    Certification Period: 9/17/2023 thru 12/15/2023  I certify that the therapy services are furnished while this  patient is under my care.  The services outlined above are required by this patient, and will be reviewed every 90 days.         Physician Signature:__________________________________________________    PHYSICIAN: Shirin Ross MD  NPI: [id number]      DATE:     Please sign and return via fax to [email or web address] . Thank you, [facility].

## 2023-09-21 ENCOUNTER — TREATMENT (OUTPATIENT)
Dept: PHYSICAL THERAPY | Facility: CLINIC | Age: 33
End: 2023-09-21
Payer: COMMERCIAL

## 2023-09-21 DIAGNOSIS — M54.41 ACUTE RIGHT-SIDED LOW BACK PAIN WITH RIGHT-SIDED SCIATICA: Primary | ICD-10-CM

## 2023-09-22 NOTE — PROGRESS NOTES
Physical Therapy Daily Treatment Note  [address] [place] [address]      Patient: Magdalena Love   : 1990  Referring practitioner: Shirin Ross MD  Date of Initial Visit: Type: THERAPY  Noted: 2023  Today's Date: 2023  Patient seen for 2 sessions         Visit Diagnoses:    ICD-10-CM ICD-9-CM   1. Acute right-sided low back pain with right-sided sciatica  M54.41 724.2     724.3       Subjective   Patient reports that the back has been feeling better. Still gets a crach with forward bending but not as bad. Feels the stretches and clamshells are helping      Objective   See Exercise, Manual, and Modality Logs for complete treatment.       Assessment/Plan  Pt without report of increased pain. Educated pt regarding other optional stretches for the hip flexor and ITB. Discussed the importance of stretching the hip flexor to aid with the catching feeling she is still having. Continue with current POT progressing pt per tolerance with a focus on pelvic stability.      Timed:         Manual Therapy:  17       mins  69262;     Therapeutic Exercise:   24      mins  88147;     Neuromuscular Beau:      mins  61311;    Therapeutic Activity:          mins  81876;     Gait Training:           mins  72453;     Ultrasound:          mins  60169;    Ionto                                   mins   37005  Self Care                            mins   31246  Canalith Repos         mins 23431      Un-Timed:  Electrical Stimulation:        mins  79389 ( );  Dry Needling          mins self-pay  Traction          mins 31835      Timed Treatment:   41   mins   Total Treatment:     41   mins    Lupe Hawley, PT  [place] License: [id number]

## 2023-10-03 ENCOUNTER — TREATMENT (OUTPATIENT)
Dept: PHYSICAL THERAPY | Facility: CLINIC | Age: 33
End: 2023-10-03
Payer: COMMERCIAL

## 2023-10-03 DIAGNOSIS — M54.41 ACUTE RIGHT-SIDED LOW BACK PAIN WITH RIGHT-SIDED SCIATICA: Primary | ICD-10-CM

## 2023-10-04 NOTE — PROGRESS NOTES
Physical Therapy Daily Treatment Note  [address] [place] [address]      Patient: Magdalena Love   : 1990  Referring practitioner: Shirin Ross MD  Date of Initial Visit: Type: THERAPY  Noted: 2023  Today's Date: 10/4/2023  Patient seen for 3 sessions         Visit Diagnoses:    ICD-10-CM ICD-9-CM   1. Acute right-sided low back pain with right-sided sciatica  M54.41 724.2     724.3       Subjective   Patient reports that the back and hip have been good. No pain with bending forward anymore. Still has a full feeling in her pelvis but thinks she is just going to have that until the end.    Objective   See Exercise, Manual, and Modality Logs for complete treatment.     Assessment/Plan  Pt without report of increased pain during treatment session today. Educated pt to keep working on the exercises and trying to keep muscles loose but not over stretching. Pt feels ready to move to her HEP. Instructed pt to contact the clinic with any questions. Hold pt's chart for 1 month to allow for assessment of efficacy of HEP. If pt does not call for an appt DC chart at that time.    Timed:         Manual Therapy:  24       mins  49406;     Therapeutic Exercise:   15      mins  45760;     Neuromuscular Beau:      mins  11458;    Therapeutic Activity:          mins  82566;     Gait Training:           mins  99021;     Ultrasound:          mins  78631;    Ionto                                   mins   63154  Self Care                            mins   60707  Canalith Repos         mins 34006      Un-Timed:  Electrical Stimulation:        mins  43810 ( );  Dry Needling          mins self-pay  Traction          mins 46125      Timed Treatment:   39   mins   Total Treatment:     39   mins    Lupe Hawley PT  [place] License: [id number]

## 2023-10-05 ENCOUNTER — ROUTINE PRENATAL (OUTPATIENT)
Dept: OBSTETRICS AND GYNECOLOGY | Facility: CLINIC | Age: 33
End: 2023-10-05
Payer: COMMERCIAL

## 2023-10-05 VITALS — SYSTOLIC BLOOD PRESSURE: 118 MMHG | WEIGHT: 152 LBS | DIASTOLIC BLOOD PRESSURE: 62 MMHG | BODY MASS INDEX: 28.16 KG/M2

## 2023-10-05 DIAGNOSIS — F32.A ANXIETY AND DEPRESSION: ICD-10-CM

## 2023-10-05 DIAGNOSIS — O44.42 LOW-LYING PLACENTA IN SECOND TRIMESTER: ICD-10-CM

## 2023-10-05 DIAGNOSIS — Z34.90 PRENATAL CARE, ANTEPARTUM: Primary | ICD-10-CM

## 2023-10-05 DIAGNOSIS — F41.9 ANXIETY AND DEPRESSION: ICD-10-CM

## 2023-10-05 LAB
GLUCOSE UR STRIP-MCNC: NEGATIVE MG/DL
PROT UR STRIP-MCNC: NEGATIVE MG/DL

## 2023-10-05 NOTE — PROGRESS NOTES
OB FOLLOW UP  CC- Here for care of pregnancy        Magdalena Love is a 33 y.o.  21w4d patient being seen today for her obstetrical follow up visit. Patient reports no complaints.. She completed PT for sciatic pain and reports that it is gone.    Her prenatal care is complicated by (and status) : None  Patient Active Problem List   Diagnosis    Anxiety and depression    Prenatal care, antepartum    Rubella non-immune status, antepartum    Low-lying placenta in second trimester       Flu Status: Desires at future appt  Ultrasound Today: Yes    ROS -   Patient Reports : No Problems  Patient Denies: Loss of Fluid, Vaginal Spotting, Vision Changes, Headaches, Nausea , Vomiting , Contractions, and Epigastric pain  Fetal Movement : normal  All other systems reviewed and are negative.       The additional following portions of the patient's history were reviewed and updated as appropriate: allergies, current medications, past medical history, past surgical history, and problem list.      I have reviewed and agree with the HPI, ROS, and historical information as entered above. Kina Chamberlain, APRN      /62   Wt 68.9 kg (152 lb)   LMP 2023   BMI 28.16 kg/m²       EXAM:     Prenatal Vitals  BP: 118/62  Weight: 68.9 kg (152 lb)   Fetal Heart Rate: US          Urine Glucose Read-only: Negative  Urine Protein Read-only: Negative       Assessment and Plan    Problem List Items Addressed This Visit          Gravid and     Prenatal care, antepartum - Primary    Overview     CF DNA testing negative.  Boy!         Relevant Orders    POC Urinalysis Dipstick (Completed)    Low-lying placenta in second trimester    Overview     21w4d, , HC 26%, AC 62%, Femur 22%, 15 oz, cephalic, +fm, anterior placenta low lying, 3 vessel cord AF normal  Pelvic rest  Refer to PDC         Relevant Orders    [facility]       Mental Health    Anxiety and depression    Overview     Zoloft  restarted at 17 weeks due to increasing anxiety and crying spells.         Relevant Medications    sertraline (Zoloft) 50 MG tablet       Pregnancy at 21w4d  Anatomy scan today is complete with abnormal findings of low lying placenta refer to PDC for evaluation in 2 weeks. and refer to PDC  Fetal status reassuring. 21w4d, , HC 26%, AC 62%, Femur 22%, 15 oz, cephalic, +fm, anterior placenta low lying, 3 vessel cord AF normal  Activity and Exercise discussed.  U/S ordered at follow up  Patient is on Prenatal vitamins  Pelvic rest until patient sees PDC.  Follow up after PDC scan (2 wks).    Kina Chamberlain, APRN  10/05/2023

## 2023-10-23 ENCOUNTER — ROUTINE PRENATAL (OUTPATIENT)
Dept: OBSTETRICS AND GYNECOLOGY | Facility: CLINIC | Age: 33
End: 2023-10-23
Payer: COMMERCIAL

## 2023-10-23 VITALS — BODY MASS INDEX: 29.52 KG/M2 | SYSTOLIC BLOOD PRESSURE: 120 MMHG | WEIGHT: 159.3 LBS | DIASTOLIC BLOOD PRESSURE: 70 MMHG

## 2023-10-23 DIAGNOSIS — Z28.39 RUBELLA NON-IMMUNE STATUS, ANTEPARTUM: ICD-10-CM

## 2023-10-23 DIAGNOSIS — Z34.90 PRENATAL CARE, ANTEPARTUM: ICD-10-CM

## 2023-10-23 DIAGNOSIS — O44.42 LOW-LYING PLACENTA IN SECOND TRIMESTER: Primary | ICD-10-CM

## 2023-10-23 DIAGNOSIS — F32.A ANXIETY AND DEPRESSION: ICD-10-CM

## 2023-10-23 DIAGNOSIS — F41.9 ANXIETY AND DEPRESSION: ICD-10-CM

## 2023-10-23 DIAGNOSIS — O09.899 RUBELLA NON-IMMUNE STATUS, ANTEPARTUM: ICD-10-CM

## 2023-10-23 LAB
GLUCOSE UR STRIP-MCNC: NEGATIVE MG/DL
PROT UR STRIP-MCNC: NEGATIVE MG/DL

## 2023-10-23 PROCEDURE — 90686 IIV4 VACC NO PRSV 0.5 ML IM: CPT | Performed by: OBSTETRICS & GYNECOLOGY

## 2023-10-23 PROCEDURE — 0502F SUBSEQUENT PRENATAL CARE: CPT | Performed by: OBSTETRICS & GYNECOLOGY

## 2023-10-23 PROCEDURE — 90471 IMMUNIZATION ADMIN: CPT | Performed by: OBSTETRICS & GYNECOLOGY

## 2023-10-23 NOTE — PROGRESS NOTES
OB FOLLOW UP  CC- Here for care of pregnancy        Magdalena Love is a 33 y.o.  24w1d patient being seen today for her obstetrical follow up visit. Patient reports no complaints.     Pt reports last  she fell down 14 steps on her buttocks and back. Pt denies bleeding and abdominal pain. Pt reports good fetal movement.     Her prenatal care is complicated by (and status) :   Patient Active Problem List   Diagnosis    Anxiety and depression    Prenatal care, antepartum    Rubella non-immune status, antepartum    Low-lying placenta in second trimester       Flu Status: Will give in office today  Ultrasound Today: No    ROS -   Patient Denies: Loss of Fluid, Vaginal Spotting, Vision Changes, Headaches, Nausea , Vomiting , Contractions, and Epigastric pain  Fetal Movement : normal  All other systems reviewed and are negative.       The additional following portions of the patient's history were reviewed and updated as appropriate: allergies, current medications, past family history, past medical history, past social history, past surgical history, and problem list.      I have reviewed and agree with the HPI, ROS, and historical information as entered above. Shirin Ross MD      /70   Wt 72.3 kg (159 lb 4.8 oz)   LMP 2023   BMI 29.52 kg/m²       EXAM:     Prenatal Vitals  BP: 120/70  Weight: 72.3 kg (159 lb 4.8 oz)   Fetal Heart Rate: 147               Urine Glucose Read-only: Negative  Urine Protein Read-only: Negative       Assessment and Plan    Problem List Items Addressed This Visit          Gravid and     Prenatal care, antepartum    Overview     CF DNA testing negative.  Boy!         Relevant Orders    POC Urinalysis Dipstick (Completed)    Fluzone (or Fluarix & Flulaval for VFC) >6 Mos (8936-2113) (Completed)    Rubella non-immune status, antepartum    Low-lying placenta in second trimester - Primary    Overview     21w4d, , HC 26%, AC 62%, Femur 22%, 15 oz,  cephalic, +fm, anterior placenta low lying, 3 vessel cord AF normal  Pelvic rest              Mental Health    Anxiety and depression    Overview     Zoloft restarted at 17 weeks due to increasing anxiety and crying spells.         Relevant Medications    sertraline (Zoloft) 50 MG tablet       Pregnancy at 24w1d  Fetal status reassuring.  No US indicated today.  1 hour gtt, CBC, Antibody screen, and TDAP next visit. Instructions given  Discussed/encouraged TDAP vaccination after 28 weeks  Activity and Exercise discussed.  Return in about 4 weeks (around 11/20/2023) for glucola, ultrasound.    Shirin Ross MD  10/23/2023

## 2023-11-16 DIAGNOSIS — O44.42 LOW-LYING PLACENTA IN SECOND TRIMESTER: Primary | ICD-10-CM

## 2023-11-16 DIAGNOSIS — Z34.90 PRENATAL CARE, ANTEPARTUM: ICD-10-CM

## 2023-11-20 ENCOUNTER — ROUTINE PRENATAL (OUTPATIENT)
Dept: OBSTETRICS AND GYNECOLOGY | Facility: CLINIC | Age: 33
End: 2023-11-20
Payer: COMMERCIAL

## 2023-11-20 VITALS — SYSTOLIC BLOOD PRESSURE: 120 MMHG | DIASTOLIC BLOOD PRESSURE: 60 MMHG | BODY MASS INDEX: 29.65 KG/M2 | WEIGHT: 160 LBS

## 2023-11-20 DIAGNOSIS — O09.899 RUBELLA NON-IMMUNE STATUS, ANTEPARTUM: ICD-10-CM

## 2023-11-20 DIAGNOSIS — F41.9 ANXIETY AND DEPRESSION: ICD-10-CM

## 2023-11-20 DIAGNOSIS — Z28.39 RUBELLA NON-IMMUNE STATUS, ANTEPARTUM: ICD-10-CM

## 2023-11-20 DIAGNOSIS — F32.A ANXIETY AND DEPRESSION: ICD-10-CM

## 2023-11-20 DIAGNOSIS — O44.42 LOW-LYING PLACENTA IN SECOND TRIMESTER: Primary | ICD-10-CM

## 2023-11-20 DIAGNOSIS — Z34.90 PRENATAL CARE, ANTEPARTUM: ICD-10-CM

## 2023-11-20 LAB
GLUCOSE UR STRIP-MCNC: NEGATIVE MG/DL
PROT UR STRIP-MCNC: NEGATIVE MG/DL

## 2023-11-20 NOTE — PROGRESS NOTES
OB FOLLOW UP  CC- Here for care of pregnancy        Magdalena Love is a 33 y.o.  28w1d patient being seen today for her obstetrical follow up. Patient reports no complaints..     Patient undergoing Glucola testing today. She is due for her testing at 1035.       MBT: O+  Rhogam:  N/A  28 week packet: reviewed with patient , counseled on fetal movement , pediatrician list reviewed, and childbirth classes reviewed  TDAP: future visit due to out of stock  Flu Status: Already given in current flu season  US done today: Yes.  Findings showed Male fetus in cephalic presentation with fetal heart rate of 167.  Placenta is anterior and no longer low-lying.  Normal fluid volume noted.  Estimated fetal weight 2 pounds 7 ounces which is 44th percentile..  I have personally evaluated the U/S and agree with the findings. Shirin Ross MD      Her prenatal care is complicated by (and status) :    Patient Active Problem List   Diagnosis    Anxiety and depression    Prenatal care, antepartum    Rubella non-immune status, antepartum    Low-lying placenta in second trimester         ROS -   Patient Reports : No Problems  Patient Denies: Loss of Fluid, Vaginal Spotting, Vision Changes, Headaches, and Contractions  Fetal Movement : normal    The additional following portions of the patient's history were reviewed and updated as appropriate: allergies, current medications, past family history, past medical history, past social history, past surgical history, and problem list.    I have reviewed and agree with the HPI, ROS, and historical information as entered above. Shirin Ross MD      /60   Wt 72.6 kg (160 lb)   LMP 2023   BMI 29.65 kg/m²         EXAM:     Prenatal Vitals  BP: 120/60  Weight: 72.6 kg (160 lb)   Fetal Heart Rate: 167bpm               Urine Glucose Read-only: Negative  Urine Protein Read-only: Negative         Assessment and Plan    Problem List Items Addressed This Visit           Gravid and     Prenatal care, antepartum    Overview     CF DNA testing negative.  Boy!         Relevant Orders    POC Urinalysis Dipstick (Completed)    CBC (No Diff)    Gestational Screen 1 Hr ([facility])    Antibody Screen    Rubella non-immune status, antepartum    Low-lying placenta in second trimester - Primary    Overview     21w4d, , HC 26%, AC 62%, Femur 22%, 15 oz, cephalic, +fm, anterior placenta low lying, 3 vessel cord AF normal  Pelvic rest  Resolved at 28w            Mental Health    Anxiety and depression    Overview     Zoloft restarted at 17 weeks due to increasing anxiety and crying spells.         Relevant Medications    sertraline (Zoloft) 50 MG tablet       Pregnancy at 28w1d  1 hr Glucola, CBC, and antibody screen today  and TDAP next visit  Fetal movement/PTL or Labor precautions  Activity and Exercise discussed.  Return in about 4 weeks (around 2023).    Shirin Ross MD  2023

## 2023-11-21 LAB
BLD GP AB SCN SERPL QL: NEGATIVE
ERYTHROCYTE [DISTWIDTH] IN BLOOD BY AUTOMATED COUNT: 12.5 % (ref 12.3–15.4)
GLUCOSE 1H P 50 G GLC PO SERPL-MCNC: 92 MG/DL (ref 65–139)
HCT VFR BLD AUTO: 34 % (ref 34–46.6)
HGB BLD-MCNC: 11.6 G/DL (ref 12–15.9)
MCH RBC QN AUTO: 30.3 PG (ref 26.6–33)
MCHC RBC AUTO-ENTMCNC: 34.1 G/DL (ref 31.5–35.7)
MCV RBC AUTO: 88.8 FL (ref 79–97)
PLATELET # BLD AUTO: 242 10*3/MM3 (ref 140–450)
RBC # BLD AUTO: 3.83 10*6/MM3 (ref 3.77–5.28)
WBC # BLD AUTO: 10.67 10*3/MM3 (ref 3.4–10.8)

## 2023-12-18 ENCOUNTER — ROUTINE PRENATAL (OUTPATIENT)
Dept: OBSTETRICS AND GYNECOLOGY | Facility: CLINIC | Age: 33
End: 2023-12-18
Payer: COMMERCIAL

## 2023-12-18 VITALS — SYSTOLIC BLOOD PRESSURE: 102 MMHG | BODY MASS INDEX: 30.39 KG/M2 | DIASTOLIC BLOOD PRESSURE: 58 MMHG | WEIGHT: 164 LBS

## 2023-12-18 DIAGNOSIS — F41.9 ANXIETY AND DEPRESSION: ICD-10-CM

## 2023-12-18 DIAGNOSIS — Z28.39 RUBELLA NON-IMMUNE STATUS, ANTEPARTUM: ICD-10-CM

## 2023-12-18 DIAGNOSIS — O09.899 RUBELLA NON-IMMUNE STATUS, ANTEPARTUM: ICD-10-CM

## 2023-12-18 DIAGNOSIS — F32.A ANXIETY AND DEPRESSION: ICD-10-CM

## 2023-12-18 DIAGNOSIS — Z34.90 PRENATAL CARE, ANTEPARTUM: Primary | ICD-10-CM

## 2023-12-18 PROBLEM — O44.42 LOW-LYING PLACENTA IN SECOND TRIMESTER: Status: RESOLVED | Noted: 2023-10-05 | Resolved: 2023-12-18

## 2023-12-18 LAB
GLUCOSE UR STRIP-MCNC: NEGATIVE MG/DL
PROT UR STRIP-MCNC: NEGATIVE MG/DL

## 2023-12-18 NOTE — PROGRESS NOTES
OB FOLLOW UP  CC- Here for care of pregnancy        Magdalena Love is a 33 y.o.  32w1d patient being seen today for her obstetrical follow up visit. Patient reports no complaints.     Her prenatal care is complicated by (and status) :    Patient Active Problem List   Diagnosis    Anxiety and depression    Prenatal care, antepartum    Rubella non-immune status, antepartum       Flu Status: Already given in current flu season  TDAP status: desires at future visit  Rhogam status: was not indicated  28 week labs: Reviewed and Labs show anemia. She is taking additional iron supplement.  Ultrasound Today: No  Non Stress Test: No.      ROS -   Patient Reports : No Problems  Patient Denies: Loss of Fluid, Vaginal Spotting, Vision Changes, Headaches, and Contractions  Fetal Movement : normal  All other systems reviewed and are negative.       The additional following portions of the patient's history were reviewed and updated as appropriate: allergies, current medications, past family history, past medical history, past social history, past surgical history, and problem list.    I have reviewed and agree with the HPI, ROS, and historical information as entered above. Candi Chen, APRN      /58   Wt 74.4 kg (164 lb)   LMP 2023   BMI 30.39 kg/m²         EXAM:     Prenatal Vitals  BP: 102/58  Weight: 74.4 kg (164 lb)   Fetal Heart Rate: 140               Urine Glucose Read-only: Negative  Urine Protein Read-only: Negative           Assessment and Plan    Problem List Items Addressed This Visit       Anxiety and depression    Overview     Zoloft restarted at 17 weeks due to increasing anxiety and crying spells.         Relevant Medications    sertraline (Zoloft) 50 MG tablet    Prenatal care, antepartum - Primary    Overview     CF DNA testing negative.  Boy!         Relevant Orders    POC Urinalysis Dipstick (Completed)    Rubella non-immune status, antepartum       Pregnancy at 32w1d  Fetal status  reassuring.  28 week labs reviewed.  She is taking the iron daily and having no constipation issues.   Activity and Exercise discussed.  Zoloft is helping with the anxiety, she states she feels very stable.   Fetal movement/PTL or Labor precautions  Reviewed Pre-eclampsia signs/symptoms  Return in about 2 weeks (around 1/1/2024) for Vandana RUDOLPH.    Candi Chen, APRN  12/18/2023

## 2024-01-03 ENCOUNTER — ROUTINE PRENATAL (OUTPATIENT)
Dept: OBSTETRICS AND GYNECOLOGY | Facility: CLINIC | Age: 34
End: 2024-01-03
Payer: COMMERCIAL

## 2024-01-03 VITALS — WEIGHT: 167 LBS | SYSTOLIC BLOOD PRESSURE: 120 MMHG | BODY MASS INDEX: 30.94 KG/M2 | DIASTOLIC BLOOD PRESSURE: 66 MMHG

## 2024-01-03 DIAGNOSIS — Z34.90 PRENATAL CARE, ANTEPARTUM: Primary | ICD-10-CM

## 2024-01-03 DIAGNOSIS — O09.899 RUBELLA NON-IMMUNE STATUS, ANTEPARTUM: ICD-10-CM

## 2024-01-03 DIAGNOSIS — O26.849 UTERINE SIZE DATE DISCREPANCY PREGNANCY: ICD-10-CM

## 2024-01-03 DIAGNOSIS — F41.9 ANXIETY AND DEPRESSION: ICD-10-CM

## 2024-01-03 DIAGNOSIS — Z28.39 RUBELLA NON-IMMUNE STATUS, ANTEPARTUM: ICD-10-CM

## 2024-01-03 DIAGNOSIS — F32.A ANXIETY AND DEPRESSION: ICD-10-CM

## 2024-01-03 LAB
GLUCOSE UR STRIP-MCNC: NEGATIVE MG/DL
PROT UR STRIP-MCNC: NEGATIVE MG/DL

## 2024-01-03 NOTE — PROGRESS NOTES
OB FOLLOW UP  CC- Here for care of pregnancy        Magdalena Love is a 33 y.o.  34w3d patient being seen today for her obstetrical follow up visit. Patient reports irregular contractions.     Her prenatal care is complicated by (and status) :   Patient Active Problem List   Diagnosis    Anxiety and depression    Prenatal care, antepartum    Rubella non-immune status, antepartum       Flu Status: Already given in current flu season  Ultrasound Today: No  Non Stress Test: No.    ROS -   Patient Reports : Contractions  Patient Denies: Loss of Fluid, Vaginal Spotting, Vision Changes, and Headaches  Fetal Movement : normal  All other systems reviewed and are negative.       The additional following portions of the patient's history were reviewed and updated as appropriate: allergies, current medications, past family history, past medical history, past social history, past surgical history, and problem list.    I have reviewed and agree with the HPI, ROS, and historical information as entered above. Shirin Ross MD      /66   Wt 75.8 kg (167 lb)   LMP 2023   BMI 30.94 kg/m²       EXAM:     Prenatal Vitals  BP: 120/66  Weight: 75.8 kg (167 lb)                   Urine Glucose Read-only: Negative  Urine Protein Read-only: Negative           Assessment and Plan    Problem List Items Addressed This Visit          Gravid and     Prenatal care, antepartum - Primary    Overview     CF DNA testing negative.  Boy!         Relevant Orders    POC Urinalysis Dipstick (Completed)    Rubella non-immune status, antepartum       Mental Health    Anxiety and depression    Overview     Zoloft restarted at 17 weeks due to increasing anxiety and crying spells.         Relevant Medications    sertraline (Zoloft) 50 MG tablet     Other Visit Diagnoses       Uterine size date discrepancy pregnancy        Relevant Orders    US Ob Follow Up Transabdominal Approach            Pregnancy at 34w3d  Fetal  status reassuring. Size less than dates.  Will get an ultrasound at next visit.  Activity and Exercise discussed.  Fetal movement/PTL or Labor precautions  GBS next visit  Return in about 2 weeks (around 1/17/2024) for ultrasound.    Shirin Ross MD  01/03/2024

## 2024-01-10 ENCOUNTER — TELEPHONE (OUTPATIENT)
Dept: OBSTETRICS AND GYNECOLOGY | Facility: CLINIC | Age: 34
End: 2024-01-10
Payer: COMMERCIAL

## 2024-01-16 ENCOUNTER — TELEPHONE (OUTPATIENT)
Dept: OBSTETRICS AND GYNECOLOGY | Facility: CLINIC | Age: 34
End: 2024-01-16
Payer: COMMERCIAL

## 2024-01-16 NOTE — TELEPHONE ENCOUNTER
Returned patient's call. G1 @ 36 2d. Reports good fetal movement. Reports onset of lower and mid back pain this afternoon; was constant; unrelieved with Tylenol. Started having cramping in lower abdomen and sharp pain in RUQ. She drank some water and has been resting for the past hour. States abdominal pain is gone but back is still bothering her. Back pain was pretty strong but states it is moderate now.   Denies any bleeding, leaking fluid, or contractions. Denies any s/s of UTI.   Next prenatal visit is 1/18/24.   Discussed with DIXON Bardales. She states since symptoms are improving, she recommends patient continue to monitor and keep appointment in 2 days as scheduled. If symptoms worsen she should call or go to L&D triage. Informed patient. Can try Tylenol, warm bath, heating pad on low to back, and pregnancy support belt for relief. She v/u and agreed.

## 2024-01-16 NOTE — TELEPHONE ENCOUNTER
Pt states she has been feeling pressure in her lower belly and pains in her upper belly. She has eaten and is feeling baby move good. She has not had any loss of fluid or bloody discharge.     Please advise

## 2024-01-18 ENCOUNTER — LAB (OUTPATIENT)
Dept: LAB | Facility: HOSPITAL | Age: 34
End: 2024-01-18
Payer: COMMERCIAL

## 2024-01-18 ENCOUNTER — ROUTINE PRENATAL (OUTPATIENT)
Dept: OBSTETRICS AND GYNECOLOGY | Facility: CLINIC | Age: 34
End: 2024-01-18
Payer: COMMERCIAL

## 2024-01-18 VITALS — WEIGHT: 170.2 LBS | SYSTOLIC BLOOD PRESSURE: 110 MMHG | BODY MASS INDEX: 31.54 KG/M2 | DIASTOLIC BLOOD PRESSURE: 64 MMHG

## 2024-01-18 DIAGNOSIS — Z34.90 PRENATAL CARE, ANTEPARTUM: Primary | ICD-10-CM

## 2024-01-18 DIAGNOSIS — F32.A ANXIETY AND DEPRESSION: ICD-10-CM

## 2024-01-18 DIAGNOSIS — Z28.39 RUBELLA NON-IMMUNE STATUS, ANTEPARTUM: ICD-10-CM

## 2024-01-18 DIAGNOSIS — O09.899 RUBELLA NON-IMMUNE STATUS, ANTEPARTUM: ICD-10-CM

## 2024-01-18 DIAGNOSIS — F41.9 ANXIETY AND DEPRESSION: ICD-10-CM

## 2024-01-18 LAB
GLUCOSE UR STRIP-MCNC: NEGATIVE MG/DL
PROT UR STRIP-MCNC: NEGATIVE MG/DL

## 2024-01-18 PROCEDURE — 87081 CULTURE SCREEN ONLY: CPT

## 2024-01-18 NOTE — PROGRESS NOTES
OB FOLLOW UP  CC- Here for care of pregnancy        Magdalena Love is a 33 y.o.  36w4d patient being seen today for her obstetrical follow up visit. Patient reports day before yesterday, had an episode of severe back pain with lower abdominal cramps. Pt states she rested and hydrated and that helped.     Her prenatal care is complicated by (and status) :   Patient Active Problem List   Diagnosis    Anxiety and depression    Prenatal care, antepartum    Rubella non-immune status, antepartum       GBS Status: Done Today. She is not allergic to PCN.    No Known Allergies       Flu Status: Already given in current flu season  Her Delivery Plan is:  IOL 24     US done today: Yes.  Findings showed Male fetus in cephalic presentation with fetal heart rate of 153.  Anterior placenta and normal fluid volume noted with PAMELA of 18.8.  Estimated fetal weight 5 pounds 13 ounces which is 25th percentile..  I have personally evaluated the U/S and agree with the findings. Shirin Ross MD    Non Stress Test: No.    ROS -   Patient Denies: Loss of Fluid, Vaginal Spotting, Vision Changes, Headaches, Nausea , Vomiting , and Epigastric pain  Fetal Movement : normal  All other systems reviewed and are negative.       The additional following portions of the patient's history were reviewed and updated as appropriate: allergies, current medications, past family history, past medical history, past social history, past surgical history, and problem list.    I have reviewed and agree with the HPI, ROS, and historical information as entered above. Shirin Ross MD        EXAM:     Prenatal Vitals  BP: 110/64  Weight: 77.2 kg (170 lb 3.2 oz)   Fetal Heart Rate: 153       Dilation/Effacement/Station  Dilation: Fingertip  Effacement (%): 30  Station: -3      Urine Glucose Read-only: Negative  Urine Protein Read-only: Negative           Assessment and Plan    Problem List Items Addressed This Visit          Gravid and      Prenatal care, antepartum - Primary    Overview     CF DNA testing negative.  Boy!  IOL 2/4 at 5 PM    Ultrasound 24 at 36 weeks and 4 days-Male fetus in cephalic presentation with fetal heart rate of 153.  Anterior placenta and normal fluid volume noted with PAMELA of 18.8.  Estimated fetal weight 5 pounds 13 ounces which is 25th percentile.         Relevant Orders    POC Urinalysis Dipstick (Completed)    Group B Streptococcus Culture - Swab, Vaginal/Rectum    Rubella non-immune status, antepartum       Mental Health    Anxiety and depression    Overview     Zoloft restarted at 17 weeks due to increasing anxiety and crying spells.         Relevant Medications    sertraline (Zoloft) 50 MG tablet       Pregnancy at 36w4d  Fetal status reassuring.   Reviewed Pre-eclampsia signs/symptoms  Delivery options reviewed with patient  Signs of labor reviewed  Kick counts reviewed  Activity and Exercise discussed.  Return in about 1 week (around 2024).    Shirin Ross MD  2024

## 2024-01-21 LAB — BACTERIA SPEC AEROBE CULT: NORMAL

## 2024-01-24 ENCOUNTER — ROUTINE PRENATAL (OUTPATIENT)
Dept: OBSTETRICS AND GYNECOLOGY | Facility: CLINIC | Age: 34
End: 2024-01-24
Payer: COMMERCIAL

## 2024-01-24 VITALS — BODY MASS INDEX: 32.02 KG/M2 | SYSTOLIC BLOOD PRESSURE: 112 MMHG | WEIGHT: 172.8 LBS | DIASTOLIC BLOOD PRESSURE: 62 MMHG

## 2024-01-24 DIAGNOSIS — F41.9 ANXIETY AND DEPRESSION: ICD-10-CM

## 2024-01-24 DIAGNOSIS — F32.A ANXIETY AND DEPRESSION: ICD-10-CM

## 2024-01-24 DIAGNOSIS — Z34.90 PRENATAL CARE, ANTEPARTUM: Primary | ICD-10-CM

## 2024-01-24 LAB
GLUCOSE UR STRIP-MCNC: NEGATIVE MG/DL
PROT UR STRIP-MCNC: NEGATIVE MG/DL

## 2024-01-24 PROCEDURE — 0502F SUBSEQUENT PRENATAL CARE: CPT | Performed by: OBSTETRICS & GYNECOLOGY

## 2024-01-24 NOTE — PROGRESS NOTES
OB FOLLOW UP  CC- Here for care of pregnancy        Magdalena Love is a 33 y.o.  37w3d patient being seen today for her obstetrical follow up visit. Patient reports occasional cramping.     Her prenatal care is complicated by (and status) :   Patient Active Problem List   Diagnosis    Anxiety and depression    Prenatal care, antepartum    Rubella non-immune status, antepartum       GBS Status:   Group B Strep Culture   Date Value Ref Range Status   2024 No Group B Streptococcus isolated  Final         No Known Allergies       Flu Status: Already given in current flu season  Her Delivery Plan is:  IOL on 24 at 5pm     US today: no  Non Stress Test: No.    ROS -   Patient Denies: Loss of Fluid, Vaginal Spotting, Vision Changes, Headaches, Nausea , Vomiting , and Epigastric pain  Fetal Movement : normal  All other systems reviewed and are negative.       The additional following portions of the patient's history were reviewed and updated as appropriate: allergies, current medications, past family history, past medical history, past social history, past surgical history, and problem list.    I have reviewed and agree with the HPI, ROS, and historical information as entered above. Shirin Ross MD        EXAM:     Prenatal Vitals  BP: 112/62  Weight: 78.4 kg (172 lb 12.8 oz)   Fetal Heart Rate: 144   Fundal Height (cm): 38 cm          Urine Glucose Read-only: Negative  Urine Protein Read-only: Negative           Assessment and Plan    Problem List Items Addressed This Visit          Gravid and     Prenatal care, antepartum - Primary    Overview     CF DNA testing negative.  Boy!  IOL  at 5 PM    Ultrasound 24 at 36 weeks and 4 days-Male fetus in cephalic presentation with fetal heart rate of 153.  Anterior placenta and normal fluid volume noted with PAMELA of 18.8.  Estimated fetal weight 5 pounds 13 ounces which is 25th percentile.         Relevant Orders    POC Urinalysis Dipstick  (Completed)       Mental Health    Anxiety and depression    Overview     Zoloft restarted at 17 weeks due to increasing anxiety and crying spells.         Relevant Medications    sertraline (Zoloft) 50 MG tablet       Pregnancy at 37w3d  Fetal status reassuring.   Reviewed Pre-eclampsia signs/symptoms  Delivery options reviewed with patient  Signs of labor reviewed  Kick counts reviewed  Activity and Exercise discussed.  Return in about 1 week (around 1/31/2024).    Shirin Ross MD  01/24/2024

## 2024-02-01 ENCOUNTER — PREP FOR SURGERY (OUTPATIENT)
Dept: OTHER | Facility: HOSPITAL | Age: 34
End: 2024-02-01
Payer: COMMERCIAL

## 2024-02-01 ENCOUNTER — ROUTINE PRENATAL (OUTPATIENT)
Dept: OBSTETRICS AND GYNECOLOGY | Facility: CLINIC | Age: 34
End: 2024-02-01
Payer: COMMERCIAL

## 2024-02-01 VITALS — BODY MASS INDEX: 31.5 KG/M2 | DIASTOLIC BLOOD PRESSURE: 80 MMHG | WEIGHT: 170 LBS | SYSTOLIC BLOOD PRESSURE: 116 MMHG

## 2024-02-01 DIAGNOSIS — O09.899 RUBELLA NON-IMMUNE STATUS, ANTEPARTUM: ICD-10-CM

## 2024-02-01 DIAGNOSIS — F32.A ANXIETY AND DEPRESSION: ICD-10-CM

## 2024-02-01 DIAGNOSIS — F41.9 ANXIETY AND DEPRESSION: ICD-10-CM

## 2024-02-01 DIAGNOSIS — Z34.90 PRENATAL CARE, ANTEPARTUM: Primary | ICD-10-CM

## 2024-02-01 DIAGNOSIS — Z28.39 RUBELLA NON-IMMUNE STATUS, ANTEPARTUM: ICD-10-CM

## 2024-02-01 LAB
GLUCOSE UR STRIP-MCNC: NEGATIVE MG/DL
PROT UR STRIP-MCNC: NEGATIVE MG/DL

## 2024-02-01 RX ORDER — SODIUM CHLORIDE 9 MG/ML
40 INJECTION, SOLUTION INTRAVENOUS AS NEEDED
Status: CANCELLED | OUTPATIENT
Start: 2024-02-01

## 2024-02-01 RX ORDER — SODIUM CHLORIDE, SODIUM LACTATE, POTASSIUM CHLORIDE, CALCIUM CHLORIDE 600; 310; 30; 20 MG/100ML; MG/100ML; MG/100ML; MG/100ML
125 INJECTION, SOLUTION INTRAVENOUS CONTINUOUS
Status: CANCELLED | OUTPATIENT
Start: 2024-02-01

## 2024-02-01 RX ORDER — ACETAMINOPHEN 325 MG/1
650 TABLET ORAL EVERY 4 HOURS PRN
Status: CANCELLED | OUTPATIENT
Start: 2024-02-01

## 2024-02-01 RX ORDER — OXYTOCIN/0.9 % SODIUM CHLORIDE 30/500 ML
250 PLASTIC BAG, INJECTION (ML) INTRAVENOUS CONTINUOUS
Status: CANCELLED | OUTPATIENT
Start: 2024-02-01 | End: 2024-02-01

## 2024-02-01 RX ORDER — CARBOPROST TROMETHAMINE 250 UG/ML
250 INJECTION, SOLUTION INTRAMUSCULAR AS NEEDED
Status: CANCELLED | OUTPATIENT
Start: 2024-02-01

## 2024-02-01 RX ORDER — NALOXONE HCL 0.4 MG/ML
0.4 VIAL (ML) INJECTION
Status: CANCELLED | OUTPATIENT
Start: 2024-02-01

## 2024-02-01 RX ORDER — MISOPROSTOL 200 UG/1
800 TABLET ORAL AS NEEDED
Status: CANCELLED | OUTPATIENT
Start: 2024-02-01

## 2024-02-01 RX ORDER — HYDROCODONE BITARTRATE AND ACETAMINOPHEN 10; 325 MG/1; MG/1
1 TABLET ORAL EVERY 4 HOURS PRN
Status: CANCELLED | OUTPATIENT
Start: 2024-02-01 | End: 2024-02-08

## 2024-02-01 RX ORDER — METHYLERGONOVINE MALEATE 0.2 MG/ML
200 INJECTION INTRAVENOUS ONCE AS NEEDED
Status: CANCELLED | OUTPATIENT
Start: 2024-02-01

## 2024-02-01 RX ORDER — SODIUM CHLORIDE 0.9 % (FLUSH) 0.9 %
10 SYRINGE (ML) INJECTION EVERY 12 HOURS SCHEDULED
Status: CANCELLED | OUTPATIENT
Start: 2024-02-01

## 2024-02-01 RX ORDER — MORPHINE SULFATE 1 MG/ML
1 INJECTION, SOLUTION EPIDURAL; INTRATHECAL; INTRAVENOUS EVERY 4 HOURS PRN
Status: CANCELLED | OUTPATIENT
Start: 2024-02-01 | End: 2024-02-06

## 2024-02-01 RX ORDER — OXYTOCIN/0.9 % SODIUM CHLORIDE 30/500 ML
2-24 PLASTIC BAG, INJECTION (ML) INTRAVENOUS
Status: CANCELLED | OUTPATIENT
Start: 2024-02-01

## 2024-02-01 RX ORDER — LIDOCAINE HYDROCHLORIDE 10 MG/ML
0.5 INJECTION, SOLUTION EPIDURAL; INFILTRATION; INTRACAUDAL; PERINEURAL ONCE AS NEEDED
Status: CANCELLED | OUTPATIENT
Start: 2024-02-01

## 2024-02-01 RX ORDER — OXYTOCIN/0.9 % SODIUM CHLORIDE 30/500 ML
999 PLASTIC BAG, INJECTION (ML) INTRAVENOUS ONCE
Status: CANCELLED | OUTPATIENT
Start: 2024-02-01 | End: 2024-02-01

## 2024-02-01 RX ORDER — MORPHINE SULFATE 2 MG/ML
2 INJECTION, SOLUTION INTRAMUSCULAR; INTRAVENOUS EVERY 4 HOURS PRN
Status: CANCELLED | OUTPATIENT
Start: 2024-02-01 | End: 2024-02-06

## 2024-02-01 RX ORDER — SODIUM CHLORIDE 0.9 % (FLUSH) 0.9 %
10 SYRINGE (ML) INJECTION AS NEEDED
Status: CANCELLED | OUTPATIENT
Start: 2024-02-01

## 2024-02-01 RX ORDER — IBUPROFEN 600 MG/1
600 TABLET ORAL EVERY 6 HOURS PRN
Status: CANCELLED | OUTPATIENT
Start: 2024-02-01

## 2024-02-01 RX ORDER — HYDROCODONE BITARTRATE AND ACETAMINOPHEN 5; 325 MG/1; MG/1
1 TABLET ORAL EVERY 4 HOURS PRN
Status: CANCELLED | OUTPATIENT
Start: 2024-02-01 | End: 2024-02-08

## 2024-02-01 RX ORDER — MAGNESIUM CARB/ALUMINUM HYDROX 105-160MG
30 TABLET,CHEWABLE ORAL ONCE
Status: CANCELLED | OUTPATIENT
Start: 2024-02-01 | End: 2024-02-01

## 2024-02-01 NOTE — PROGRESS NOTES
OB FOLLOW UP  CC- Here for care of pregnancy        Magdalena Love is a 33 y.o.  38w4d patient being seen today for her obstetrical follow up visit. Patient reports more than 5 contractions per hour, last night was every 8-10 minutes. This morning they are every 10 minutes.     Her prenatal care is complicated by (and status) :   Patient Active Problem List   Diagnosis    Anxiety and depression    Prenatal care, antepartum    Rubella non-immune status, antepartum       GBS Status:   Group B Strep Culture   Date Value Ref Range Status   2024 No Group B Streptococcus isolated  Final         No Known Allergies       Flu Status: Already given in current flu season  Her Delivery Plan is: Desires IOL at 39wks. Scheduled   @ 5PM  US today: no  Non Stress Test: No.    ROS -   Patient Denies: Loss of Fluid, Vaginal Spotting, Vision Changes, and Headaches  Fetal Movement : normal  All other systems reviewed and are negative.       The additional following portions of the patient's history were reviewed and updated as appropriate: allergies, current medications, past family history, past medical history, past social history, past surgical history, and problem list.    I have reviewed and agree with the HPI, ROS, and historical information as entered above. Shirin Ross MD        EXAM:     Prenatal Vitals  BP: 116/80  Weight: 77.1 kg (170 lb)   Fetal Heart Rate: 158   Fundal Height (cm): 39 cm   Dilation/Effacement/Station  Dilation: 1  Effacement (%): 70  Station: -2      Urine Glucose Read-only: Negative  Urine Protein Read-only: Negative           Assessment and Plan    Problem List Items Addressed This Visit          Gravid and     Prenatal care, antepartum - Primary    Overview     CF DNA testing negative.  Boy!  IOL  at 5 PM    Ultrasound 24 at 36 weeks and 4 days-Male fetus in cephalic presentation with fetal heart rate of 153.  Anterior placenta and normal fluid volume noted  with PAMELA of 18.8.  Estimated fetal weight 5 pounds 13 ounces which is 25th percentile.         Relevant Orders    POC Urinalysis Dipstick (Completed)    Rubella non-immune status, antepartum       Mental Health    Anxiety and depression    Overview     Zoloft restarted at 17 weeks due to increasing anxiety and crying spells.         Relevant Medications    sertraline (Zoloft) 50 MG tablet       Pregnancy at 38w4d  Fetal status reassuring.   Reviewed Pre-eclampsia signs/symptoms  Delivery options reviewed with patient  Signs of labor reviewed  Kick counts reviewed  Activity and Exercise discussed.  Return in about 7 weeks (around 3/21/2024) for PP check.    Shirin Ross MD  02/01/2024

## 2024-02-03 ENCOUNTER — HOSPITAL ENCOUNTER (OUTPATIENT)
Facility: HOSPITAL | Age: 34
Discharge: HOME | End: 2024-02-03
Attending: OBSTETRICS & GYNECOLOGY | Admitting: OBSTETRICS & GYNECOLOGY
Payer: COMMERCIAL

## 2024-02-03 VITALS
DIASTOLIC BLOOD PRESSURE: 72 MMHG | BODY MASS INDEX: 31.28 KG/M2 | OXYGEN SATURATION: 96 % | WEIGHT: 170 LBS | RESPIRATION RATE: 18 BRPM | SYSTOLIC BLOOD PRESSURE: 118 MMHG | TEMPERATURE: 98.3 F | HEIGHT: 62 IN | HEART RATE: 76 BPM

## 2024-02-03 PROBLEM — O36.8131 DECREASED FETAL MOVEMENT, THIRD TRIMESTER, FETUS 1: Status: ACTIVE | Noted: 2024-02-03

## 2024-02-03 PROCEDURE — 59025 FETAL NON-STRESS TEST: CPT

## 2024-02-03 PROCEDURE — G0463 HOSPITAL OUTPT CLINIC VISIT: HCPCS

## 2024-02-03 RX ORDER — FERROUS SULFATE 325(65) MG
325 TABLET ORAL
Status: ON HOLD | COMMUNITY

## 2024-02-04 ENCOUNTER — HOSPITAL ENCOUNTER (INPATIENT)
Facility: HOSPITAL | Age: 34
LOS: 3 days | Discharge: HOME | End: 2024-02-07
Attending: OBSTETRICS & GYNECOLOGY | Admitting: OBSTETRICS & GYNECOLOGY
Payer: COMMERCIAL

## 2024-02-04 ENCOUNTER — PREP FOR SURGERY (OUTPATIENT)
Dept: OTHER | Facility: HOSPITAL | Age: 34
End: 2024-02-04

## 2024-02-04 ENCOUNTER — HOSPITAL ENCOUNTER (OUTPATIENT)
Dept: LABOR AND DELIVERY | Facility: HOSPITAL | Age: 34
Discharge: HOME | End: 2024-02-04
Payer: COMMERCIAL

## 2024-02-04 DIAGNOSIS — Z34.90 PRENATAL CARE, ANTEPARTUM: ICD-10-CM

## 2024-02-04 LAB
ABO GROUP BLD: NORMAL
ABO GROUP BLD: NORMAL
AMPHET+METHAMPHET UR QL: NEGATIVE
AMPHETAMINES UR QL: NEGATIVE
BARBITURATES UR QL SCN: NEGATIVE
BASOPHILS # BLD AUTO: 0.03 10*3/MM3 (ref 0–0.2)
BASOPHILS NFR BLD AUTO: 0.2 % (ref 0–1.5)
BENZODIAZ UR QL SCN: NEGATIVE
BLD GP AB SCN SERPL QL: NEGATIVE
BUPRENORPHINE SERPL-MCNC: NEGATIVE NG/ML
CANNABINOIDS SERPL QL: NEGATIVE
COCAINE UR QL: NEGATIVE
DEPRECATED RDW RBC AUTO: 42.7 FL (ref 37–54)
EOSINOPHIL # BLD AUTO: 0.13 10*3/MM3 (ref 0–0.4)
EOSINOPHIL NFR BLD AUTO: 1.1 % (ref 0.3–6.2)
ERYTHROCYTE [DISTWIDTH] IN BLOOD BY AUTOMATED COUNT: 13.2 % (ref 12.3–15.4)
FENTANYL UR-MCNC: NEGATIVE NG/ML
HCT VFR BLD AUTO: 35.2 % (ref 34–46.6)
HGB BLD-MCNC: 12.2 G/DL (ref 12–15.9)
IMM GRANULOCYTES # BLD AUTO: 0.07 10*3/MM3 (ref 0–0.05)
IMM GRANULOCYTES NFR BLD AUTO: 0.6 % (ref 0–0.5)
LYMPHOCYTES # BLD AUTO: 2.94 10*3/MM3 (ref 0.7–3.1)
LYMPHOCYTES NFR BLD AUTO: 24.1 % (ref 19.6–45.3)
MCH RBC QN AUTO: 30.7 PG (ref 26.6–33)
MCHC RBC AUTO-ENTMCNC: 34.7 G/DL (ref 31.5–35.7)
MCV RBC AUTO: 88.4 FL (ref 79–97)
METHADONE UR QL SCN: NEGATIVE
MONOCYTES # BLD AUTO: 0.62 10*3/MM3 (ref 0.1–0.9)
MONOCYTES NFR BLD AUTO: 5.1 % (ref 5–12)
NEUTROPHILS NFR BLD AUTO: 68.9 % (ref 42.7–76)
NEUTROPHILS NFR BLD AUTO: 8.4 10*3/MM3 (ref 1.7–7)
NRBC BLD AUTO-RTO: 0 /100 WBC (ref 0–0.2)
OPIATES UR QL: NEGATIVE
OXYCODONE UR QL SCN: NEGATIVE
PCP UR QL SCN: NEGATIVE
PLATELET # BLD AUTO: 235 10*3/MM3 (ref 140–450)
PMV BLD AUTO: 9.9 FL (ref 6–12)
RBC # BLD AUTO: 3.98 10*6/MM3 (ref 3.77–5.28)
RH BLD: POSITIVE
RH BLD: POSITIVE
T PALLIDUM IGG SER QL: NORMAL
T&S EXPIRATION DATE: NORMAL
TRICYCLICS UR QL SCN: NEGATIVE
WBC NRBC COR # BLD AUTO: 12.19 10*3/MM3 (ref 3.4–10.8)

## 2024-02-04 PROCEDURE — 86901 BLOOD TYPING SEROLOGIC RH(D): CPT

## 2024-02-04 PROCEDURE — 59200 INSERT CERVICAL DILATOR: CPT | Performed by: OBSTETRICS & GYNECOLOGY

## 2024-02-04 PROCEDURE — 25010000002 MORPHINE PER 10 MG: Performed by: OBSTETRICS & GYNECOLOGY

## 2024-02-04 PROCEDURE — 80307 DRUG TEST PRSMV CHEM ANLYZR: CPT | Performed by: OBSTETRICS & GYNECOLOGY

## 2024-02-04 PROCEDURE — 86780 TREPONEMA PALLIDUM: CPT | Performed by: OBSTETRICS & GYNECOLOGY

## 2024-02-04 PROCEDURE — 86900 BLOOD TYPING SEROLOGIC ABO: CPT | Performed by: OBSTETRICS & GYNECOLOGY

## 2024-02-04 PROCEDURE — 25810000003 LACTATED RINGERS PER 1000 ML: Performed by: OBSTETRICS & GYNECOLOGY

## 2024-02-04 PROCEDURE — 86901 BLOOD TYPING SEROLOGIC RH(D): CPT | Performed by: OBSTETRICS & GYNECOLOGY

## 2024-02-04 PROCEDURE — 86850 RBC ANTIBODY SCREEN: CPT | Performed by: OBSTETRICS & GYNECOLOGY

## 2024-02-04 PROCEDURE — 85025 COMPLETE CBC W/AUTO DIFF WBC: CPT | Performed by: OBSTETRICS & GYNECOLOGY

## 2024-02-04 PROCEDURE — 86900 BLOOD TYPING SEROLOGIC ABO: CPT

## 2024-02-04 RX ORDER — OXYTOCIN/0.9 % SODIUM CHLORIDE 30/500 ML
2-24 PLASTIC BAG, INJECTION (ML) INTRAVENOUS
Status: DISCONTINUED | OUTPATIENT
Start: 2024-02-04 | End: 2024-02-05 | Stop reason: HOSPADM

## 2024-02-04 RX ORDER — LIDOCAINE HYDROCHLORIDE 10 MG/ML
0.5 INJECTION, SOLUTION EPIDURAL; INFILTRATION; INTRACAUDAL; PERINEURAL ONCE AS NEEDED
Status: DISCONTINUED | OUTPATIENT
Start: 2024-02-04 | End: 2024-02-05 | Stop reason: HOSPADM

## 2024-02-04 RX ORDER — SODIUM CHLORIDE 0.9 % (FLUSH) 0.9 %
10 SYRINGE (ML) INJECTION EVERY 12 HOURS SCHEDULED
Status: DISCONTINUED | OUTPATIENT
Start: 2024-02-04 | End: 2024-02-05 | Stop reason: HOSPADM

## 2024-02-04 RX ORDER — MORPHINE SULFATE 2 MG/ML
2 INJECTION, SOLUTION INTRAMUSCULAR; INTRAVENOUS EVERY 4 HOURS PRN
Status: DISCONTINUED | OUTPATIENT
Start: 2024-02-04 | End: 2024-02-04

## 2024-02-04 RX ORDER — MORPHINE SULFATE 2 MG/ML
2 INJECTION, SOLUTION INTRAMUSCULAR; INTRAVENOUS
Status: DISCONTINUED | OUTPATIENT
Start: 2024-02-04 | End: 2024-02-05 | Stop reason: HOSPADM

## 2024-02-04 RX ORDER — MORPHINE SULFATE 2 MG/ML
1 INJECTION, SOLUTION INTRAMUSCULAR; INTRAVENOUS
Status: DISCONTINUED | OUTPATIENT
Start: 2024-02-04 | End: 2024-02-05 | Stop reason: HOSPADM

## 2024-02-04 RX ORDER — NALOXONE HCL 0.4 MG/ML
0.4 VIAL (ML) INJECTION
Status: DISCONTINUED | OUTPATIENT
Start: 2024-02-04 | End: 2024-02-05 | Stop reason: HOSPADM

## 2024-02-04 RX ORDER — NALOXONE HCL 0.4 MG/ML
0.4 VIAL (ML) INJECTION
Status: DISCONTINUED | OUTPATIENT
Start: 2024-02-04 | End: 2024-02-04

## 2024-02-04 RX ORDER — SODIUM CHLORIDE 0.9 % (FLUSH) 0.9 %
10 SYRINGE (ML) INJECTION AS NEEDED
Status: DISCONTINUED | OUTPATIENT
Start: 2024-02-04 | End: 2024-02-05 | Stop reason: HOSPADM

## 2024-02-04 RX ORDER — MAGNESIUM CARB/ALUMINUM HYDROX 105-160MG
30 TABLET,CHEWABLE ORAL ONCE
Status: DISCONTINUED | OUTPATIENT
Start: 2024-02-04 | End: 2024-02-05 | Stop reason: HOSPADM

## 2024-02-04 RX ORDER — SODIUM CHLORIDE 9 MG/ML
40 INJECTION, SOLUTION INTRAVENOUS AS NEEDED
Status: DISCONTINUED | OUTPATIENT
Start: 2024-02-04 | End: 2024-02-05 | Stop reason: HOSPADM

## 2024-02-04 RX ORDER — SODIUM CHLORIDE, SODIUM LACTATE, POTASSIUM CHLORIDE, CALCIUM CHLORIDE 600; 310; 30; 20 MG/100ML; MG/100ML; MG/100ML; MG/100ML
125 INJECTION, SOLUTION INTRAVENOUS CONTINUOUS
Status: DISCONTINUED | OUTPATIENT
Start: 2024-02-04 | End: 2024-02-06

## 2024-02-04 RX ORDER — ACETAMINOPHEN 325 MG/1
650 TABLET ORAL EVERY 4 HOURS PRN
Status: DISCONTINUED | OUTPATIENT
Start: 2024-02-04 | End: 2024-02-05 | Stop reason: HOSPADM

## 2024-02-04 RX ORDER — MORPHINE SULFATE 2 MG/ML
1 INJECTION, SOLUTION INTRAMUSCULAR; INTRAVENOUS EVERY 4 HOURS PRN
Status: DISCONTINUED | OUTPATIENT
Start: 2024-02-04 | End: 2024-02-04

## 2024-02-04 RX ADMIN — Medication 2 MILLI-UNITS/MIN: at 20:38

## 2024-02-04 RX ADMIN — MORPHINE SULFATE 2 MG: 2 INJECTION, SOLUTION INTRAMUSCULAR; INTRAVENOUS at 22:40

## 2024-02-04 RX ADMIN — SODIUM CHLORIDE, POTASSIUM CHLORIDE, SODIUM LACTATE AND CALCIUM CHLORIDE 125 ML/HR: 600; 310; 30; 20 INJECTION, SOLUTION INTRAVENOUS at 18:03

## 2024-02-04 RX ADMIN — MORPHINE SULFATE 1 MG: 2 INJECTION, SOLUTION INTRAMUSCULAR; INTRAVENOUS at 20:34

## 2024-02-04 NOTE — H&P
"[facility]  Magdalena Love  : 1990  MRN: [id number]  CSN: [id number]    History and Physical    Subjective   Chief Complaint   Patient presents with    Decreased Fetal Movement     Magdalena Love is a 33 y.o. year old  with an Estimated Date of Delivery: 24 currently at 38w6d presenting with perception of decreased fetal movement.  Denies leaking or bleeding.  Since arrival she has noticed the movement has returned to normal.    Prenatal care has been with Dr. Ross.  It has been benign.  She is scheduled for an elective induction of labor tomorrow evening.    OB History    Para Term  AB Living   1 0 0 0 0 0   SAB IAB Ectopic Molar Multiple Live Births   0 0 0 0 0 0      # Outcome Date GA Lbr Sam/2nd Weight Sex Delivery Anes PTL Lv   1 Current                Past Medical History:   Diagnosis Date    Anxiety and depression        Past Surgical History:   Procedure Laterality Date    WISDOM TOOTH EXTRACTION      [place], [place]    MASTOPEXY Bilateral 10/2015    [place], [place]    LASIK Bilateral        Medications Prior to Admission   Medication Sig Dispense Refill Last Dose    doxylamine (UNISOM) 25 MG tablet Take 1 tablet by mouth At Night As Needed for Sleep.   2024    ferrous sulfate 325 (65 FE) MG tablet Take 1 tablet by mouth Daily With Breakfast.   2024    Misc Natural Products (SUPER GREENS PO) Take  by mouth.   Past Week    prenatal vitamin (prenatal, CLASSIC, vitamin) tablet Take  by mouth Daily.   2024    sertraline (Zoloft) 50 MG tablet Take 1 tablet by mouth Daily. 30 tablet 12 2/3/2024 at 1500       No Known Allergies  Social History    Tobacco Use      Smoking status: Never      Smokeless tobacco: Never    Review of Systems      Objective   /72 (BP Location: Right arm, Patient Position: Lying)   Pulse 76   Temp 98.3 °F (36.8 °C) (Oral)   Resp 18   Ht 157.5 cm (62\")   Wt 77.1 kg (170 lb)   LMP 2023   SpO2 96%   BMI 31.09 " kg/m²   General: well developed; well nourished  no acute distress   Abdomen: soft, non-tender; no masses  no umbilical or inguinal hernias are present  no hepato-splenomegaly   FHT's: reassuring and category 1      Cervix: was not checked.   Contractions: none     Prenatal Labs  Lab Results   Component Value Date    HGB 11.6 (L) 2023    RUBELLAABIGG <0.90 (L) 2023    HEPBSAG Negative 2023    ABSCRN Negative 2023    [id number] Non Reactive 2023    HEPCVIRUSABY Non Reactive 2023    GCT 92 2023    STREPGPB No Group B Streptococcus isolated 2024    URINECX Final report 2023    CHLAMNAA Negative 2023    NGONORRHON Negative 2023       Current Labs Reviewed   none       Assessment   IUP at 38w6d  Subjective decreased fetal movement which is now resolved.  Nonstress testing is category 1 and reassuring     Plan   Discharge to home  Labor warnings given  Return tomorrow for scheduled induction as already planned    Yovani Motta MD  2/3/2024  22:00 EST          Non Stress Test     [place]  Patient: Magdalena Love  : 1990  MRN: [id number]  CSN: [id number]  Date: 2/3/2024    Estimated Date of Delivery: 24  Gestational Age: 38w6d    Indication for NST Decreased fetal movement       Total Time on NST > 20 minutes       Interpretation    Baseline  beats per minute   Category 1   Decelerations Absent       Additional Comments none       This note has been electronically signed.    Yovani Motta MD  2/3/2024  22:00 EST

## 2024-02-05 ENCOUNTER — ANESTHESIA (OUTPATIENT)
Dept: LABOR AND DELIVERY | Facility: HOSPITAL | Age: 34
End: 2024-02-05
Payer: COMMERCIAL

## 2024-02-05 ENCOUNTER — ANESTHESIA EVENT (OUTPATIENT)
Dept: LABOR AND DELIVERY | Facility: HOSPITAL | Age: 34
End: 2024-02-05
Payer: COMMERCIAL

## 2024-02-05 LAB
ATMOSPHERIC PRESS: ABNORMAL MM[HG]
ATMOSPHERIC PRESS: ABNORMAL MM[HG]
BASE EXCESS BLDCOA CALC-SCNC: -19.7 MMOL/L (ref 0–2)
BASE EXCESS BLDCOV CALC-SCNC: -9.5 MMOL/L (ref 0–2)
BDY SITE: ABNORMAL
BDY SITE: ABNORMAL
BODY TEMPERATURE: 37
BODY TEMPERATURE: 37
CO2 BLDA-SCNC: 18.8 MMOL/L (ref 22–33)
CO2 BLDA-SCNC: 20.6 MMOL/L (ref 22–33)
EPAP: 0
EPAP: 0
HCO3 BLDCOA-SCNC: 17.5 MMOL/L (ref 16.9–20.5)
HCO3 BLDCOV-SCNC: 17.6 MMOL/L (ref 18.6–21.4)
HGB BLDA-MCNC: 20.4 G/DL (ref 14–18)
HGB BLDA-MCNC: 20.7 G/DL (ref 14–18)
INHALED O2 CONCENTRATION: 21 %
INHALED O2 CONCENTRATION: 21 %
IPAP: 0
IPAP: 0
Lab: ABNORMAL
MODALITY: ABNORMAL
MODALITY: ABNORMAL
NOTIFIED BY: ABNORMAL
NOTIFIED WHO: ABNORMAL
PAW @ PEAK INSP FLOW SETTING VENT: 0 CMH2O
PAW @ PEAK INSP FLOW SETTING VENT: 0 CMH2O
PCO2 BLDCOA: 101 MMHG (ref 43.3–54.9)
PCO2 BLDCOV: 41.4 MM HG (ref 28–40)
PH BLDCOA: 6.85 PH UNITS (ref 7.22–7.3)
PH BLDCOV: 7.24 PH UNITS (ref 7.31–7.37)
PO2 BLDCOA: 13.8 MMHG (ref 11.5–43.3)
PO2 BLDCOV: 32.8 MM HG (ref 21–31)
SAO2 % BLDCOA: 6.9 %
SAO2 % BLDCOA: ABNORMAL %
SAO2 % BLDCOV: 67.6 %
TOTAL RATE: 0 BREATHS/MINUTE
TOTAL RATE: 0 BREATHS/MINUTE
VENTILATOR MODE: ABNORMAL

## 2024-02-05 PROCEDURE — 25010000002 ROPIVACAINE PER 1 MG: Performed by: NURSE ANESTHETIST, CERTIFIED REGISTERED

## 2024-02-05 PROCEDURE — 25810000003 LACTATED RINGERS SOLUTION: Performed by: NURSE ANESTHETIST, CERTIFIED REGISTERED

## 2024-02-05 PROCEDURE — 82805 BLOOD GASES W/O2 SATURATION: CPT

## 2024-02-05 PROCEDURE — 0KQM0ZZ REPAIR PERINEUM MUSCLE, OPEN APPROACH: ICD-10-PCS | Performed by: OBSTETRICS & GYNECOLOGY

## 2024-02-05 PROCEDURE — C1755 CATHETER, INTRASPINAL: HCPCS | Performed by: ANESTHESIOLOGY

## 2024-02-05 PROCEDURE — 25010000002 METHYLERGONOVINE MALEATE PER 0.2 MG

## 2024-02-05 PROCEDURE — 94799 UNLISTED PULMONARY SVC/PX: CPT

## 2024-02-05 PROCEDURE — 25810000003 LACTATED RINGERS SOLUTION: Performed by: OBSTETRICS & GYNECOLOGY

## 2024-02-05 PROCEDURE — 25810000003 LACTATED RINGERS PER 1000 ML: Performed by: OBSTETRICS & GYNECOLOGY

## 2024-02-05 PROCEDURE — 25010000002 FENTANYL CITRATE (PF) 50 MCG/ML SOLUTION: Performed by: NURSE ANESTHETIST, CERTIFIED REGISTERED

## 2024-02-05 PROCEDURE — C1755 CATHETER, INTRASPINAL: HCPCS

## 2024-02-05 PROCEDURE — 59025 FETAL NON-STRESS TEST: CPT

## 2024-02-05 PROCEDURE — 59400 OBSTETRICAL CARE: CPT | Performed by: OBSTETRICS & GYNECOLOGY

## 2024-02-05 RX ORDER — OXYTOCIN/0.9 % SODIUM CHLORIDE 30/500 ML
999 PLASTIC BAG, INJECTION (ML) INTRAVENOUS ONCE
Status: COMPLETED | OUTPATIENT
Start: 2024-02-05 | End: 2024-02-05

## 2024-02-05 RX ORDER — CARBOPROST TROMETHAMINE 250 UG/ML
250 INJECTION, SOLUTION INTRAMUSCULAR AS NEEDED
Status: DISCONTINUED | OUTPATIENT
Start: 2024-02-05 | End: 2024-02-06

## 2024-02-05 RX ORDER — HYDROCORTISONE 25 MG/G
1 CREAM TOPICAL AS NEEDED
Status: DISCONTINUED | OUTPATIENT
Start: 2024-02-05 | End: 2024-02-07 | Stop reason: HOSPADM

## 2024-02-05 RX ORDER — METHYLERGONOVINE MALEATE 0.2 MG/ML
200 INJECTION INTRAVENOUS ONCE AS NEEDED
Status: DISCONTINUED | OUTPATIENT
Start: 2024-02-05 | End: 2024-02-07 | Stop reason: HOSPADM

## 2024-02-05 RX ORDER — ONDANSETRON 2 MG/ML
4 INJECTION INTRAMUSCULAR; INTRAVENOUS ONCE AS NEEDED
Status: DISCONTINUED | OUTPATIENT
Start: 2024-02-05 | End: 2024-02-05 | Stop reason: HOSPADM

## 2024-02-05 RX ORDER — METHYLERGONOVINE MALEATE 0.2 MG/ML
INJECTION INTRAVENOUS
Status: COMPLETED
Start: 2024-02-05 | End: 2024-02-05

## 2024-02-05 RX ORDER — EPHEDRINE SULFATE 5 MG/ML
10 INJECTION INTRAVENOUS
Status: DISCONTINUED | OUTPATIENT
Start: 2024-02-05 | End: 2024-02-05 | Stop reason: HOSPADM

## 2024-02-05 RX ORDER — ONDANSETRON 2 MG/ML
4 INJECTION INTRAMUSCULAR; INTRAVENOUS EVERY 6 HOURS PRN
Status: DISCONTINUED | OUTPATIENT
Start: 2024-02-05 | End: 2024-02-07 | Stop reason: HOSPADM

## 2024-02-05 RX ORDER — PROMETHAZINE HYDROCHLORIDE 12.5 MG/1
12.5 TABLET ORAL EVERY 4 HOURS PRN
Status: DISCONTINUED | OUTPATIENT
Start: 2024-02-05 | End: 2024-02-07 | Stop reason: HOSPADM

## 2024-02-05 RX ORDER — MISOPROSTOL 200 UG/1
800 TABLET ORAL AS NEEDED
Status: DISCONTINUED | OUTPATIENT
Start: 2024-02-05 | End: 2024-02-07 | Stop reason: HOSPADM

## 2024-02-05 RX ORDER — SODIUM CHLORIDE 0.9 % (FLUSH) 0.9 %
1-10 SYRINGE (ML) INJECTION AS NEEDED
Status: DISCONTINUED | OUTPATIENT
Start: 2024-02-05 | End: 2024-02-07 | Stop reason: HOSPADM

## 2024-02-05 RX ORDER — HYDROCODONE BITARTRATE AND ACETAMINOPHEN 10; 325 MG/1; MG/1
1 TABLET ORAL EVERY 4 HOURS PRN
Status: DISCONTINUED | OUTPATIENT
Start: 2024-02-05 | End: 2024-02-07 | Stop reason: HOSPADM

## 2024-02-05 RX ORDER — OXYTOCIN/0.9 % SODIUM CHLORIDE 30/500 ML
250 PLASTIC BAG, INJECTION (ML) INTRAVENOUS CONTINUOUS
Status: ACTIVE | OUTPATIENT
Start: 2024-02-05 | End: 2024-02-05

## 2024-02-05 RX ORDER — DOCUSATE SODIUM 100 MG/1
100 CAPSULE, LIQUID FILLED ORAL 2 TIMES DAILY
Status: DISCONTINUED | OUTPATIENT
Start: 2024-02-05 | End: 2024-02-07 | Stop reason: HOSPADM

## 2024-02-05 RX ORDER — CITRIC ACID/SODIUM CITRATE 334-500MG
30 SOLUTION, ORAL ORAL ONCE
Status: DISCONTINUED | OUTPATIENT
Start: 2024-02-05 | End: 2024-02-05 | Stop reason: HOSPADM

## 2024-02-05 RX ORDER — FAMOTIDINE 10 MG/ML
20 INJECTION, SOLUTION INTRAVENOUS ONCE AS NEEDED
Status: DISCONTINUED | OUTPATIENT
Start: 2024-02-05 | End: 2024-02-05 | Stop reason: HOSPADM

## 2024-02-05 RX ORDER — IBUPROFEN 600 MG/1
600 TABLET ORAL EVERY 6 HOURS PRN
Status: DISCONTINUED | OUTPATIENT
Start: 2024-02-05 | End: 2024-02-07 | Stop reason: HOSPADM

## 2024-02-05 RX ORDER — FENTANYL CITRATE 50 UG/ML
INJECTION, SOLUTION INTRAMUSCULAR; INTRAVENOUS AS NEEDED
Status: DISCONTINUED | OUTPATIENT
Start: 2024-02-05 | End: 2024-02-05 | Stop reason: SURG

## 2024-02-05 RX ORDER — HYDROCODONE BITARTRATE AND ACETAMINOPHEN 5; 325 MG/1; MG/1
1 TABLET ORAL EVERY 4 HOURS PRN
Status: DISCONTINUED | OUTPATIENT
Start: 2024-02-05 | End: 2024-02-07 | Stop reason: HOSPADM

## 2024-02-05 RX ORDER — DIPHENHYDRAMINE HYDROCHLORIDE 50 MG/ML
12.5 INJECTION INTRAMUSCULAR; INTRAVENOUS EVERY 8 HOURS PRN
Status: DISCONTINUED | OUTPATIENT
Start: 2024-02-05 | End: 2024-02-05 | Stop reason: HOSPADM

## 2024-02-05 RX ORDER — ACETAMINOPHEN 325 MG/1
650 TABLET ORAL EVERY 4 HOURS PRN
Status: DISCONTINUED | OUTPATIENT
Start: 2024-02-05 | End: 2024-02-05 | Stop reason: SDUPTHER

## 2024-02-05 RX ORDER — OXYTOCIN/0.9 % SODIUM CHLORIDE 30/500 ML
125 PLASTIC BAG, INJECTION (ML) INTRAVENOUS CONTINUOUS PRN
Status: DISCONTINUED | OUTPATIENT
Start: 2024-02-05 | End: 2024-02-07 | Stop reason: HOSPADM

## 2024-02-05 RX ORDER — EPHEDRINE SULFATE 5 MG/ML
INJECTION INTRAVENOUS
Status: DISCONTINUED
Start: 2024-02-05 | End: 2024-02-07 | Stop reason: HOSPADM

## 2024-02-05 RX ORDER — SODIUM CHLORIDE, SODIUM LACTATE, POTASSIUM CHLORIDE, CALCIUM CHLORIDE 600; 310; 30; 20 MG/100ML; MG/100ML; MG/100ML; MG/100ML
100 INJECTION, SOLUTION INTRAVENOUS CONTINUOUS
Status: DISCONTINUED | OUTPATIENT
Start: 2024-02-05 | End: 2024-02-06

## 2024-02-05 RX ORDER — METOCLOPRAMIDE HYDROCHLORIDE 5 MG/ML
10 INJECTION INTRAMUSCULAR; INTRAVENOUS ONCE AS NEEDED
Status: DISCONTINUED | OUTPATIENT
Start: 2024-02-05 | End: 2024-02-05 | Stop reason: HOSPADM

## 2024-02-05 RX ORDER — ROPIVACAINE HYDROCHLORIDE 2 MG/ML
15 INJECTION, SOLUTION EPIDURAL; INFILTRATION; PERINEURAL CONTINUOUS
Status: DISCONTINUED | OUTPATIENT
Start: 2024-02-05 | End: 2024-02-06

## 2024-02-05 RX ORDER — ACETAMINOPHEN 325 MG/1
650 TABLET ORAL EVERY 6 HOURS PRN
Status: DISCONTINUED | OUTPATIENT
Start: 2024-02-05 | End: 2024-02-07 | Stop reason: HOSPADM

## 2024-02-05 RX ORDER — ONDANSETRON 4 MG/1
4 TABLET, ORALLY DISINTEGRATING ORAL EVERY 6 HOURS PRN
Status: DISCONTINUED | OUTPATIENT
Start: 2024-02-05 | End: 2024-02-07 | Stop reason: HOSPADM

## 2024-02-05 RX ORDER — HYDROCODONE BITARTRATE AND ACETAMINOPHEN 5; 325 MG/1; MG/1
1 TABLET ORAL EVERY 4 HOURS PRN
Status: DISCONTINUED | OUTPATIENT
Start: 2024-02-05 | End: 2024-02-05 | Stop reason: HOSPADM

## 2024-02-05 RX ORDER — LIDOCAINE HYDROCHLORIDE AND EPINEPHRINE 15; 5 MG/ML; UG/ML
INJECTION, SOLUTION EPIDURAL AS NEEDED
Status: DISCONTINUED | OUTPATIENT
Start: 2024-02-05 | End: 2024-02-05 | Stop reason: SURG

## 2024-02-05 RX ORDER — BISACODYL 10 MG
10 SUPPOSITORY, RECTAL RECTAL DAILY PRN
Status: DISCONTINUED | OUTPATIENT
Start: 2024-02-06 | End: 2024-02-07 | Stop reason: HOSPADM

## 2024-02-05 RX ORDER — IBUPROFEN 600 MG/1
600 TABLET ORAL EVERY 6 HOURS PRN
Status: DISCONTINUED | OUTPATIENT
Start: 2024-02-05 | End: 2024-02-05 | Stop reason: SDUPTHER

## 2024-02-05 RX ADMIN — ROPIVACAINE HYDROCHLORIDE 14 ML/HR: 2 INJECTION, SOLUTION EPIDURAL; INFILTRATION at 07:59

## 2024-02-05 RX ADMIN — METHYLERGONOVINE MALEATE 200 MCG: 0.2 INJECTION, SOLUTION INTRAMUSCULAR; INTRAVENOUS at 15:27

## 2024-02-05 RX ADMIN — SODIUM CHLORIDE, POTASSIUM CHLORIDE, SODIUM LACTATE AND CALCIUM CHLORIDE 1000 ML: 600; 310; 30; 20 INJECTION, SOLUTION INTRAVENOUS at 07:55

## 2024-02-05 RX ADMIN — BENZOCAINE AND LEVOMENTHOL 1 APPLICATION: 200; 5 SPRAY TOPICAL at 17:45

## 2024-02-05 RX ADMIN — LIDOCAINE HYDROCHLORIDE AND EPINEPHRINE 3 ML: 15; 5 INJECTION, SOLUTION EPIDURAL at 07:53

## 2024-02-05 RX ADMIN — FENTANYL CITRATE 100 MCG: 50 INJECTION, SOLUTION INTRAMUSCULAR; INTRAVENOUS at 07:55

## 2024-02-05 RX ADMIN — Medication 999 ML/HR: at 15:55

## 2024-02-05 RX ADMIN — ROPIVACAINE HYDROCHLORIDE 10 ML: 5 INJECTION, SOLUTION EPIDURAL; INFILTRATION; PERINEURAL at 07:57

## 2024-02-05 RX ADMIN — HYDROCODONE BITARTRATE AND ACETAMINOPHEN 1 TABLET: 5; 325 TABLET ORAL at 17:37

## 2024-02-05 RX ADMIN — SODIUM CHLORIDE, POTASSIUM CHLORIDE, SODIUM LACTATE AND CALCIUM CHLORIDE 1000 ML: 600; 310; 30; 20 INJECTION, SOLUTION INTRAVENOUS at 07:30

## 2024-02-05 RX ADMIN — SODIUM CHLORIDE, POTASSIUM CHLORIDE, SODIUM LACTATE AND CALCIUM CHLORIDE 125 ML/HR: 600; 310; 30; 20 INJECTION, SOLUTION INTRAVENOUS at 03:07

## 2024-02-05 NOTE — PROGRESS NOTES
Patient comfortable with epidural  Fetal heart tones category 1  Contractions with [place] units that are adequate  Pitocin at 18  Cervix 6-7/80/-1  A/P: Continue induction of labor

## 2024-02-05 NOTE — H&P
History and Physical:    Subjective     Chief Complaint   Patient presents with    Scheduled Induction       Magdalena Aditya Love is a 33 y.o. year old  with an Estimated Date of Delivery: 24 currently at 39w1d presenting with  elective induction of labor. .    Prenatal care has been with Shirin Ross MD.  It has been significant for  anxiety .      Review of Systems   Constitutional: Negative.    HENT: Negative.     Respiratory: Negative.     Cardiovascular: Negative.    Gastrointestinal: Negative.    Genitourinary: Negative.    Musculoskeletal: Negative.    Skin: Negative.    Allergic/Immunologic: Negative.    Neurological: Negative.    Hematological: Negative.    Psychiatric/Behavioral: Negative.             Past Medical History:   Diagnosis Date    Anxiety and depression      Past Surgical History:   Procedure Laterality Date    LASIK Bilateral     MASTOPEXY Bilateral 10/2015    [place], [place]    WISDOM TOOTH EXTRACTION      [place], [place]     Family History   Problem Relation Age of Onset    Hyperlipidemia Father     Epilepsy Paternal Grandmother     Dementia Maternal Grandfather     Breast cancer Neg Hx     Ovarian cancer Neg Hx     Uterine cancer Neg Hx     Colon cancer Neg Hx      Social History     Tobacco Use    Smoking status: Never     Passive exposure: Never    Smokeless tobacco: Never   Vaping Use    Vaping Use: Former    Quit date: 6/10/2023    Substances: Nicotine, Flavoring    Devices: Disposable, Pre-filled pod   Substance Use Topics    Alcohol use: Not Currently     Alcohol/week: 2.0 standard drinks of alcohol     Types: 1 Glasses of wine, 1 Cans of beer per week     Comment: not while pregnant    Drug use: Never     Medications Prior to Admission   Medication Sig Dispense Refill Last Dose    doxylamine (UNISOM) 25 MG tablet Take 1 tablet by mouth At Night As Needed for Sleep.   2/3/2024    ferrous sulfate 325 (65 FE) MG tablet Take 1 tablet by mouth Daily With Breakfast.  "  2024    prenatal vitamin (prenatal, CLASSIC, vitamin) tablet Take  by mouth Daily.   2/3/2024    sertraline (Zoloft) 50 MG tablet Take 1 tablet by mouth Daily. 30 tablet 12 2024    Misc Natural Products (SUPER GREENS PO) Take  by mouth.   Unknown     Allergies:  Patient has no known allergies.  OB History    Para Term  AB Living   1             SAB IAB Ectopic Molar Multiple Live Births                    # Outcome Date GA Lbr Sam/2nd Weight Sex Delivery Anes PTL Lv   1 Current                      Objective     BP 97/55 (BP Location: Left arm, Patient Position: Lying)   Pulse 77   Temp 98.6 °F (37 °C) (Oral)   Resp 15   Ht 157.5 cm (62\")   Wt 77.1 kg (170 lb)   LMP 2023   Breastfeeding No   BMI 31.09 kg/m²     Physical Exam    General:  No acute distress   Lung: Clear to auscultation bilaterally, no wheezing, clear at bases   Heart: Regular rate and rhythm, no murmurs    Abdomen: Gravid, nontender   Extremities: 2+ DTR's bilaterally, no edema   FHT's: reactive    Cervix: 4/70/-2.  AROM for thick meconium.  IUPC placed without difficulty.   [place]: Contraction are Q 2-3           Lab Review   External Prenatal Results       Pregnancy Outside Results - Transcribed From Office Records - See Scanned Records For Details       Test Value Date Time    ABO  O  24 1854    Rh  Positive  24 1854    Antibody Screen  Negative  24 1801       Negative  23 1351       Negative  23 1016    Varicella IgG       Rubella  <0.90 index 23 1016    Hgb  12.2 g/dL 24 1801       11.6 g/dL 23 1351       13.7 g/dL 23 1016    Hct  35.2 % 24 1801       34.0 % 23 1351       42.3 % 23 1016    Glucose Fasting GTT       Glucose Tolerance Test 1 hour       Glucose Tolerance Test 3 hour       Gonorrhea (discrete)  Negative  23 1016    Chlamydia (discrete)  Negative  23 1016    RPR  Non Reactive  23 1016    VDRL       Syphilis " Antibody       HBsAg  Negative  07/12/23 1016    Herpes Simplex Virus PCR       Herpes Simplex VIrus Culture       HIV  Non Reactive  07/12/23 1016    Hep C RNA Quant PCR       Hep C Antibody  Non Reactive  07/12/23 1016    AFP       Group B Strep  No Group B Streptococcus isolated  01/18/24 1807    GBS Susceptibility to Clindamycin       GBS Susceptibility to Erythromycin       Fetal Fibronectin       Genetic Testing, Maternal Blood                 Drug Screening       Test Value Date Time    Urine Drug Screen       Amphetamine Screen  Negative  02/04/24 1801    Barbiturate Screen  Negative  02/04/24 1801    Benzodiazepine Screen  Negative  02/04/24 1801    Methadone Screen  Negative  02/04/24 1801    Phencyclidine Screen  Negative  02/04/24 1801    Opiates Screen  Negative  02/04/24 1801    THC Screen  Negative  02/04/24 1801    Cocaine Screen       Propoxyphene Screen       Buprenorphine Screen  Negative  02/04/24 1801    Methamphetamine Screen       Oxycodone Screen  Negative  02/04/24 1801    Tricyclic Antidepressants Screen  Negative  02/04/24 1801              Legend    ^: Historical                                Lab Results   Component Value Date    ALKPHOS 56 11/21/2022    ALT 19 11/21/2022    AST 23 11/21/2022    CREATININE 0.70 11/21/2022    BILITOT <0.2 11/21/2022     Lab Results   Component Value Date    WBC 12.19 (H) 02/04/2024    HGB 12.2 02/04/2024    HCT 35.2 02/04/2024    MCV 88.4 02/04/2024     02/04/2024        Results for orders placed in visit on 01/18/24     Ob Follow Up Transabdominal Approach    Narrative  PAT NAME: CHUCKY MUÑOZ  MED REC#: [id number]  BIRTH DA: 1990  PAT GEND: F  ACCOUNT#: [id number]  PAT TYPE: O  EXAM JENNIFER: [id number]  REF PHYS ALAN VALERO  ACCESSION [id number]      Indication  ========    Small for Dates      Comparison Studies  =================    The findings of this study are compared to the prior ultrasound study dated  23      Method  =======    [place] E6, Transabdominal ultrasound examination      Pregnancy  =========    [place] pregnancy. Number of fetuses: 1      Dating  ======    LMP on: 2023  GA by LMP 37 w + 6 d  KALYN by LMP: 2024  Method of dating: based on stated KALYN  GA by prior assessment 36 w + 4 d  KALYN by prior assessment: 2024  Ultrasound examination on: 2024  GA by U/S based upon: AC, BPD, Femur, HC  GA by U/S 35 w + 2 d  KALYN by U/S: 2024  Previous dating: based on stated KALYN, selected on 2023  Agreed KALYN of previous datin2024  Assigned: based on stated KALYN, selected on 2024  Assigned GA 36 w + 4 d  Assigned KALYN: 2024  Pregnancy length 280 d      General Evaluation  ================    Cardiac activity present.  bpm.  Fetal movements visualized.  Presentation cephalic.  Placenta Placental site: anterior.  Amniotic fluid Amount of AF: normal. MVP 5.8 cm. PAMELA 18.8 cm. Q1 5.8 cm, Q2 5.4 cm, Q3 4.9 cm, Q4 2.8 cm.  Fetal breathing noted.      Fetal Biometry  ============    Standard  BPD 85.6 mm  34w 4d        11%        Hadlock    .7 mm  35w 5d        9%        Hadlock    .8 mm  35w 2d        24%        Hadlock    Femur 69.3 mm  35w 4d        22%        Hadlock    HC / AC 1.02    EFW 2,640 g  25%        Omar    EFW (lb) 5 lb  EFW (oz) 13 oz  EFW by: Hadlock (BPD-HC-AC-FL)  Other: An ultrasound for fetal weight has a margin of error of up to twenty percent.  Extremities / Bony Struc  FL / BPD 0.81    FL / HC 0.22    FL / AC 0.22    Other Structures   bpm      Fetal Anatomy  ============    Lateral ventricles: Appears normal  Stomach: Appears normal  Kidneys: Appears normal  Bladder: Appears normal  Gender: male  Wants to know gender: yes      Impression  ==========    Male fetus in cephalic presentation with fetal heart rate of 153. Anterior placenta and normal fluid volume noted with PAMELA of 18.8. Estimated fetal weight 5 pounds  13  ounces which is 25th percentile.      Recommendation  ===============    follow up .Return for follow-up as clinically indicated.        Sonographer: RT Unique(R), [facility]  Physician: Shirin Ross MD, FACOG    Electronically signed by: Shirin Ross MD, FACOG at: 2024/01/18 08:41              Patient Active Problem List    Diagnosis Date Noted    *Pregnancy 02/04/2024    Decreased fetal movement, third trimester, fetus 1 02/03/2024    Rubella non-immune status, antepartum 08/08/2023    Prenatal care, antepartum 07/12/2023     Note Last Updated: 1/18/2024     CF DNA testing negative.  Boy!  IOL 2/4 at 5 PM    Ultrasound 1/18/24 at 36 weeks and 4 days-Male fetus in cephalic presentation with fetal heart rate of 153.  Anterior placenta and normal fluid volume noted with PAMELA of 18.8.  Estimated fetal weight 5 pounds 13 ounces which is 25th percentile.      Anxiety and depression 04/20/2022     Note Last Updated: 9/7/2023     Zoloft restarted at 17 weeks due to increasing anxiety and crying spells.          Assessment & Plan     ASSESSMENT  IUP at 39w1d  induction of labor  Thick meconium-start amnioinfusion   3. GBBS negative    PLAN  Admit to labor and delivery   2.  pitocin and adler bulb         Shirin Ross MD  2/5/2024@

## 2024-02-05 NOTE — L&D DELIVERY NOTE
[facility]   Vaginal Delivery Note    Patient Name: Magdalena Love  : 1990  MRN: [id number]    Date of Delivery: 2024     Diagnosis     Pre & Post-Delivery:  Intrauterine pregnancy at 39w1d  Labor status: Induced Onset of Labor     Pregnancy             Problem List    Transfer to Postpartum     Review the Delivery Report for details.     Delivery     Delivery: Vaginal, Spontaneous     YOB: 2022    Time of Birth:  Gestational Age 3:15 PM   39w1d     Anesthesia: Epidural     Delivering clinician: Shirin Ross    Forceps?   No   Vacuum? No    Shoulder dystocia present: No        Delivery narrative: Patient progressed quickly from 6 to complete.  She was noted to be in +1 to +2 station.  She started pushing and pushed well for approximately 1 hour.  During that time variable decelerations were noted with each contraction/pushing episode but fetal heart tones returned to baseline in between contractions with good [id number] variability.  Head was noted to be asynclitic.  It was evident with the last few contractions that a peroneal band was holding up the delivery.  Verbal consent for episiotomy was given.  A 1 cm left medial lateral episiotomy was performed.  Without contraction, head delivered in AVTAR with an asynclitic presentation.  Nuchal cord x 1 was reduced.  Shoulders and body easily followed.  Baby placed on maternal abdomen and stimulated.  Nose and mouth were bulb suction.  Cord was milked x 4 and clamped x 2.  Father cut cord.   team was awaiting delivery secondary to thick meconium.  Cord gases were collected.  Stem cell collection was then collected per patient request.  Minimal cord blood was also obtained for specimen.  Placenta delivered spontaneously and intact.  Uterine atony noted.  A dose of Methergine was administered and aggressive uterine massage.  Atony resolved with this.  A small 2 cm left vaginal sidewall along with the left medial  lateral episiotomy without extension was then repaired with a 3-0 Vicryl.  All sponge, lap, and needle counts were correct x 2.      Infant     Findings: male  infant     Infant observations: Weight: 3230 g (7 lb 1.9 oz)   Length: 19.5  in  Observations/Comments:  void and stool in LDR      Apgars: 4  @ 1 minute /    6  @ 5 minutes  8 @10 minutes   Infant Name: Clyde Luong     Placenta & Cord         Placenta delivered  Spontaneous  at        Cord: 3 vessels  present.   Nuchal Cord?  yes; Number of nuchal loops present:  1    Cord blood obtained: Yes    Cord gases obtained:  Yes    Cord gas results: Venous:    pH, Cord Venous   Date Value Ref Range Status   02/05/2024 7.236 (L) 7.310 - 7.370 pH Units Final     Base Excess, Cord Venous   Date Value Ref Range Status   02/05/2024 -9.5 (L) 0.0 - 2.0 mmol/L Final       Arterial:    pH, Cord Arterial   Date Value Ref Range Status   02/05/2024 6.85 (C) 7.22 - 7.30 pH Units Final     Comment:     85 Value below critical limit     Base Exc, Cord Arterial   Date Value Ref Range Status   02/05/2024 -19.7 (C) 0.0 - 2.0 mmol/L Final        Repair     Episiotomy: Left Mediolateral     Yes  Suture used for repair: 3-0 Vicryl    Lacerations: Yes  Laceration Information  Laceration Repaired?   Perineal:       Periurethral:       Labial:       Sulcus:       Vaginal: Yes  Yes    Cervical:         Suture used for repair: 3-0 Vicryl     Estimated Blood Loss: Est. Blood Loss (mL): 400 mL (Filed from Delivery Summary) (02/05/24 1515)     Quantitative Blood Loss:          Complications     none    Disposition     Mother to Mother Baby/Postpartum  in stable condition currently.  Baby to NICU  in stable condition currently.    Shirin Ross MD  02/05/24  17:24 EST

## 2024-02-05 NOTE — ANESTHESIA PROCEDURE NOTES
Labor Epidural      Patient reassessed immediately prior to procedure    Patient location during procedure: OB  Performed By  CRNA/CAA: Radha Ravi CRNA  Preanesthetic Checklist  Completed: patient identified, IV checked, risks and benefits discussed, surgical consent, monitors and equipment checked, pre-op evaluation and timeout performed  Additional Notes  DPE-25 gauge Yue  Prep:  Pt Position:sitting  Sterile Tech:cap, gloves, mask and sterile barrier  Prep:DuraPrep  Monitoring:blood pressure monitoring  Epidural Block Procedure:  Approach:midline  Guidance:palpation technique  Location:L3-L4  Needle Type:Tuohy  Needle Gauge:17 G  Loss of Resistance Medium: saline  Loss of Resistance: 6cm  Cath Depth at skin:13 cm  Paresthesia: none  Aspiration:negative  Test Dose:negative  Number of Attempts: 1  Post Assessment:  Dressing:occlusive dressing applied and secured with tape  Pt Tolerance:patient tolerated the procedure well with no apparent complications  Complications:no

## 2024-02-05 NOTE — PROGRESS NOTES
33 y.o.  at 39w   OB History          1    Para        Term                AB        Living             SAB        IAB        Ectopic        Molar        Multiple        Live Births                 Presents as an induction of labor due to elective rigid cervix   Her primary OB requests a Clark Bulb placement to initiate the induction of labor.    Fetal Heart Rate Assessment   Method:     Beats/min:     Baseline:     Varibility:     Accels:     Decels:     Tracing Category:       TOCO  SVE       A Clark Bulb was placed without difficulties with 60 mL of sterile saline.  The patient tolerated the procedure well.

## 2024-02-05 NOTE — ANESTHESIA PREPROCEDURE EVALUATION
Anesthesia Evaluation     Patient summary reviewed and Nursing notes reviewed   NPO Solid Status: > 6 hours  NPO Liquid Status: > 6 hours           Airway   Dental      Pulmonary - negative pulmonary ROS   Cardiovascular - negative cardio ROS        Neuro/Psych  (+) psychiatric history Anxiety and Depression  GI/Hepatic/Renal/Endo - negative ROS     Musculoskeletal (-) negative ROS    Abdominal    Substance History - negative use     OB/GYN    (+) Pregnant        Other                    Anesthesia Plan    ASA 2     epidural       Anesthetic plan, risks, benefits, and alternatives have been provided, discussed and informed consent has been obtained with: patient.    CODE STATUS:    Level Of Support Discussed With: Patient  Code Status (Patient has no pulse and is not breathing): CPR (Attempt to Resuscitate)  Medical Interventions (Patient has pulse or is breathing): Full Support

## 2024-02-06 LAB
BASOPHILS # BLD AUTO: 0.06 10*3/MM3 (ref 0–0.2)
BASOPHILS NFR BLD AUTO: 0.4 % (ref 0–1.5)
DEPRECATED RDW RBC AUTO: 43.1 FL (ref 37–54)
EOSINOPHIL # BLD AUTO: 0.37 10*3/MM3 (ref 0–0.4)
EOSINOPHIL NFR BLD AUTO: 2.2 % (ref 0.3–6.2)
ERYTHROCYTE [DISTWIDTH] IN BLOOD BY AUTOMATED COUNT: 13.7 % (ref 12.3–15.4)
HCT VFR BLD AUTO: 27.2 % (ref 34–46.6)
HGB BLD-MCNC: 9.4 G/DL (ref 12–15.9)
IMM GRANULOCYTES # BLD AUTO: 0.13 10*3/MM3 (ref 0–0.05)
IMM GRANULOCYTES NFR BLD AUTO: 0.8 % (ref 0–0.5)
LYMPHOCYTES # BLD AUTO: 4.71 10*3/MM3 (ref 0.7–3.1)
LYMPHOCYTES NFR BLD AUTO: 27.5 % (ref 19.6–45.3)
MCH RBC QN AUTO: 30.2 PG (ref 26.6–33)
MCHC RBC AUTO-ENTMCNC: 34.6 G/DL (ref 31.5–35.7)
MCV RBC AUTO: 87.5 FL (ref 79–97)
MONOCYTES # BLD AUTO: 1.03 10*3/MM3 (ref 0.1–0.9)
MONOCYTES NFR BLD AUTO: 6 % (ref 5–12)
NEUTROPHILS NFR BLD AUTO: 10.84 10*3/MM3 (ref 1.7–7)
NEUTROPHILS NFR BLD AUTO: 63.1 % (ref 42.7–76)
NRBC BLD AUTO-RTO: 0 /100 WBC (ref 0–0.2)
PLATELET # BLD AUTO: 163 10*3/MM3 (ref 140–450)
PMV BLD AUTO: 9.9 FL (ref 6–12)
RBC # BLD AUTO: 3.11 10*6/MM3 (ref 3.77–5.28)
WBC NRBC COR # BLD AUTO: 17.14 10*3/MM3 (ref 3.4–10.8)

## 2024-02-06 PROCEDURE — 0503F POSTPARTUM CARE VISIT: CPT

## 2024-02-06 PROCEDURE — 85025 COMPLETE CBC W/AUTO DIFF WBC: CPT | Performed by: OBSTETRICS & GYNECOLOGY

## 2024-02-06 RX ADMIN — IBUPROFEN 600 MG: 600 TABLET, FILM COATED ORAL at 00:02

## 2024-02-06 RX ADMIN — IBUPROFEN 600 MG: 600 TABLET, FILM COATED ORAL at 20:49

## 2024-02-06 RX ADMIN — DOCUSATE SODIUM 100 MG: 100 CAPSULE, LIQUID FILLED ORAL at 00:02

## 2024-02-06 RX ADMIN — DOCUSATE SODIUM 100 MG: 100 CAPSULE, LIQUID FILLED ORAL at 20:49

## 2024-02-06 RX ADMIN — HYDROCODONE BITARTRATE AND ACETAMINOPHEN 1 TABLET: 10; 325 TABLET ORAL at 00:02

## 2024-02-06 RX ADMIN — HYDROCODONE BITARTRATE AND ACETAMINOPHEN 1 TABLET: 10; 325 TABLET ORAL at 08:24

## 2024-02-06 RX ADMIN — SERTRALINE HYDROCHLORIDE 50 MG: 50 TABLET ORAL at 08:21

## 2024-02-06 RX ADMIN — DOCUSATE SODIUM 100 MG: 100 CAPSULE, LIQUID FILLED ORAL at 08:21

## 2024-02-06 NOTE — PROGRESS NOTES
2/6/2024  PPD #1    Subjective   Magdalena feels well.  Patient describes her lochia less than menses.  Pain is well controlled       Objective   Temp: Temp:  [97.7 °F (36.5 °C)-99.2 °F (37.3 °C)] 97.8 °F (36.6 °C) Temp src: Oral   BP: BP: (0-132)/(0-83) 119/72        Pulse: Heart Rate:  [] 82  RR: Resp:  [16] 16    General:  No acute distress   Abdomen: Fundus firm and beneath umbilicus   Pelvis: deferred     Lab Results   Component Value Date    WBC 17.14 (H) 02/06/2024    HGB 9.4 (L) 02/06/2024    HCT 27.2 (L) 02/06/2024    MCV 87.5 02/06/2024     02/06/2024    HEPBSAG Negative 07/12/2023     Results from last 7 days   Lab Units 02/04/24  1801   TREPONEMA PALLIDUM AB TOTAL  Non-Reactive     Infant: Male. Desires circ.     Assessment  PPD# 1 after vaginal delivery. Doing well.   Asymptomatic anemia. VSS.     Plan  Routine postpartum care. Advance.       This note has been electronically signed.    Isabella Esteves, APRN  February 6, 2024

## 2024-02-06 NOTE — ANESTHESIA POSTPROCEDURE EVALUATION
Patient: Magdalena Love    Procedure Summary       Date: 02/05/24 Room / Location:     Anesthesia Start: 0740 Anesthesia Stop: 1515    Procedure: LABOR ANALGESIA Diagnosis:     Scheduled Providers:  Provider: Zoltan Corbett MD    Anesthesia Type: epidural ASA Status: 2            Anesthesia Type: epidural    Vitals  Vitals Value Taken Time   /72 02/06/24 0700   Temp 97.8 °F (36.6 °C) 02/06/24 0700   Pulse 82 02/06/24 0700   Resp 16 02/06/24 0700   SpO2 92 % 02/05/24 1548           Post Anesthesia Care and Evaluation    Patient location during evaluation: bedside  Patient participation: complete - patient participated  Level of consciousness: awake and alert  Pain management: adequate    Airway patency: patent  Anesthetic complications: No anesthetic complications    Cardiovascular status: acceptable  Respiratory status: acceptable  Hydration status: acceptable  Post Neuraxial Block status: Motor and sensory function returned to baseline and No signs or symptoms of PDPH

## 2024-02-07 VITALS
HEART RATE: 85 BPM | RESPIRATION RATE: 16 BRPM | HEIGHT: 62 IN | OXYGEN SATURATION: 92 % | WEIGHT: 170 LBS | SYSTOLIC BLOOD PRESSURE: 117 MMHG | TEMPERATURE: 98.7 F | BODY MASS INDEX: 31.28 KG/M2 | DIASTOLIC BLOOD PRESSURE: 57 MMHG

## 2024-02-07 PROCEDURE — 90707 MMR VACCINE SC: CPT | Performed by: NURSE PRACTITIONER

## 2024-02-07 PROCEDURE — 90471 IMMUNIZATION ADMIN: CPT | Performed by: NURSE PRACTITIONER

## 2024-02-07 PROCEDURE — 25010000002 MEASLES, MUMPS & RUBELLA VAC RECONSTITUTED SOLUTION: Performed by: NURSE PRACTITIONER

## 2024-02-07 PROCEDURE — 0503F POSTPARTUM CARE VISIT: CPT | Performed by: NURSE PRACTITIONER

## 2024-02-07 RX ADMIN — DOCUSATE SODIUM 100 MG: 100 CAPSULE, LIQUID FILLED ORAL at 09:02

## 2024-02-07 RX ADMIN — MEASLES, MUMPS, AND RUBELLA VIRUS VACCINE LIVE 0.5 ML: 1000; 12500; 1000 INJECTION, POWDER, LYOPHILIZED, FOR SUSPENSION SUBCUTANEOUS at 11:24

## 2024-02-07 RX ADMIN — IBUPROFEN 600 MG: 600 TABLET, FILM COATED ORAL at 10:18

## 2024-02-07 RX ADMIN — SERTRALINE HYDROCHLORIDE 50 MG: 50 TABLET ORAL at 09:02

## 2024-02-07 NOTE — DISCHARGE SUMMARY
Discharge Summary    Date of Admission: 2024  Date of Discharge:  2024      Patient: Magdalena Love      MR#:[id number]    Delivery Provider: Shirin Ross     Attending Provider: Shirin Ross MD    Presenting Problem/History of Present Illness  Pregnancy [Z34.90]       Pregnancy     (normal spontaneous vaginal delivery)         Discharge Diagnosis: Vaginal delivery at 39w1d    Procedures:  Vaginal, Spontaneous     2024    3:15 PM        Discharge Date: 2024;     Hospital Course  Patient is a 33 y.o. female  at 39w1d status post vaginal delivery without complication. Postpartum the patient did well. She remained afebrile, with vital signs stable. She was ready for discharge on postpartum day 2.     Infant:   male  fetus 3230 g (7 lb 1.9 oz)  with Apgar scores of 4 , 6  at five minutes. Circumcision complete.    Condition on Discharge:  Stable    Vital Signs  Temp:  [98.1 °F (36.7 °C)-98.7 °F (37.1 °C)] 98.7 °F (37.1 °C)  Heart Rate:  [83-89] 85  Resp:  [16-18] 16  BP: (117)/(57-65) 117/57    Lab Results   Component Value Date    WBC 17.14 (H) 2024    HGB 9.4 (L) 2024    HCT 27.2 (L) 2024    MCV 87.5 2024     2024       Discharge Disposition  Home or Self Care    Discharge Medications     Discharge Medications        Continue These Medications        Instructions Start Date   prenatal (CLASSIC) vitamin 28-0.8 MG tablet tablet  Generic drug: prenatal vitamin   Oral, Daily             Stop These Medications      doxylamine 25 MG tablet  Commonly known as: UNISOM     ferrous sulfate 325 (65 FE) MG tablet     sertraline 50 MG tablet  Commonly known as: Zoloft     SUPER GREENS PO              Discharge Diet:     Activity at Discharge:   Activity Instructions       Sexual Activity Restrictions      Type of Restriction: Sex    Explain Sexual Activity Restrictions: Pelvic rest for 6 weeks    Work Restrictions      Type of Restriction: Work     May Return to Work: After Next Appointment    With / Without Restrictions: Without Restrictions            Follow-up Appointments  Future Appointments   Date Time Provider Department Center   3/21/2024 10:10 AM Shirin Ross MD [facility]     Additional Instructions for the Follow-ups that You Need to Schedule       Call MD With Problems / Concerns   As directed      Instructions: Call for temp >/= 100.4, severe pain, severe bleeding, headache that does not improve with rest, medication, blurred vision, or postpartum depression. Monitor incision for signs of infection including, foul odor, discharge or separation of the wound.  Call for blood clots larger than a golf ball or saturating a pad in less than an hour.    Order Comments: Instructions: Call for temp >/= 100.4, severe pain, severe bleeding, headache that does not improve with rest, medication, blurred vision, or postpartum depression. Monitor incision for signs of infection including, foul odor, discharge or separation of the wound.  Call for blood clots larger than a golf ball or saturating a pad in less than an hour.         Discharge Follow-up with Specified Provider: Dr. Ross; 6 Weeks   As directed      To: Dr. Ross   Follow Up: 6 Weeks                Frandy Lees, APRN  02/07/24  09:36 EST  [facility]

## 2024-03-20 PROBLEM — O09.899 RUBELLA NON-IMMUNE STATUS, ANTEPARTUM: Status: RESOLVED | Noted: 2023-08-08 | Resolved: 2024-03-20

## 2024-03-20 PROBLEM — O36.8131 DECREASED FETAL MOVEMENT, THIRD TRIMESTER, FETUS 1: Status: RESOLVED | Noted: 2024-02-03 | Resolved: 2024-03-20

## 2024-03-20 PROBLEM — Z34.90 PREGNANCY: Status: RESOLVED | Noted: 2024-02-04 | Resolved: 2024-03-20

## 2024-03-20 PROBLEM — Z28.39 RUBELLA NON-IMMUNE STATUS, ANTEPARTUM: Status: RESOLVED | Noted: 2023-08-08 | Resolved: 2024-03-20

## 2024-03-20 PROBLEM — Z34.90 PRENATAL CARE, ANTEPARTUM: Status: RESOLVED | Noted: 2023-07-12 | Resolved: 2024-03-20

## 2024-03-21 ENCOUNTER — POSTPARTUM VISIT (OUTPATIENT)
Dept: OBSTETRICS AND GYNECOLOGY | Facility: CLINIC | Age: 34
End: 2024-03-21
Payer: COMMERCIAL

## 2024-03-21 VITALS
BODY MASS INDEX: 28.05 KG/M2 | SYSTOLIC BLOOD PRESSURE: 104 MMHG | DIASTOLIC BLOOD PRESSURE: 62 MMHG | WEIGHT: 152.4 LBS | HEIGHT: 62 IN

## 2024-03-21 DIAGNOSIS — F32.A ANXIETY AND DEPRESSION: ICD-10-CM

## 2024-03-21 DIAGNOSIS — F41.9 ANXIETY AND DEPRESSION: ICD-10-CM

## 2024-03-21 DIAGNOSIS — Z30.011 ENCOUNTER FOR INITIAL PRESCRIPTION OF CONTRACEPTIVE PILLS: ICD-10-CM

## 2024-03-21 RX ORDER — NORETHINDRONE ACETATE AND ETHINYL ESTRADIOL 1MG-20(21)
1 KIT ORAL DAILY
Qty: 28 TABLET | Refills: 12 | Status: SHIPPED | OUTPATIENT
Start: 2024-03-21 | End: 2025-03-21

## 2024-03-21 NOTE — PROGRESS NOTES
Chief Complaint   Patient presents with    Postpartum Care       Postpartum Visit         Magdalena Love is a 33 y.o.  who presents today for a 6 week(s) postpartum check.     C/S: no     Vaginal, Spontaneous    Information for the patient's :  Clyde Love [[id number]]   [birth year]24   male   Clyde Love   3230 g (7 lb 1.9 oz)   Gestational Age: 39w1d        Baby Discharged: Discharged with Mom, NICU for 3 hours  Delivering Physician: Shirin Ross MD    Her pregnancy was complicated by thick meconium, head up, uterine atony, methergine given. The left vaginal sidewall along with the left medial lateral episiotomy without extension  is healing well. Patient describes vaginal bleeding as absent.  Patient is bottle feeding.  She desires her options  for contraception, pt states it took them 7 months after stopping BC to conceive and states she does want next baby fairly close in age so does not know if she should go back on BC or not. Pt states she has had a period since delivery, lasted 5 days.      She would like to discuss the following complaints today: none.    Patient denies concerns for postpartum depression/anxiety. Patient denies  suicidal or homicidal ideation. Her postpartum depression screening questionnaire: 0. She is currently being treated with zoloft which she was on during pregnancy as well for anxiety and depression. She feels it is helping.      Last Pap : 2023. Results: negative. HPV: negative.   Last Completed Pap Smear            PAP SMEAR (Every 3 Years) Next due on 2023  LIQUID-BASED PAP SMEAR WITH HPV GENOTYPING REGARDLESS OF INTERPRETATION ([facility]ZIA,[facility])                      The additional following portions of the patient's history were reviewed and updated as appropriate: allergies, current medications, past family history, past medical history, past social history, past surgical history, and problem list.    Review of Systems  "  Constitutional: Negative.    HENT: Negative.     Eyes: Negative.    Respiratory: Negative.     Cardiovascular: Negative.    Gastrointestinal: Negative.    Endocrine: Negative.    Genitourinary: Negative.    Musculoskeletal: Negative.    Skin: Negative.    Allergic/Immunologic: Negative.    Neurological: Negative.    Hematological: Negative.    Psychiatric/Behavioral: Negative.       All other systems reviewed and are negative.     I have reviewed and agree with the HPI, ROS, and historical information as entered above. Shirin Ross MD      /62   Ht 157.5 cm (62\")   Wt 69.1 kg (152 lb 6.4 oz)   LMP 2023   Breastfeeding No   BMI 27.87 kg/m²     Physical Exam  Vitals and nursing note reviewed. Exam conducted with a chaperone present.   Constitutional:       Appearance: She is well-developed.   HENT:      Head: Normocephalic and atraumatic.   Pulmonary:      Effort: Pulmonary effort is normal.   Abdominal:      Palpations: Abdomen is soft. Abdomen is not rigid.   Genitourinary:     Vagina: No lesions or prolapsed vaginal walls.      Cervix: No lesion or erythema.      Uterus: Enlarged. Not tender and no uterine prolapse.       Adnexa:         Right: No mass, tenderness or fullness.          Left: No mass, tenderness or fullness.     Musculoskeletal:      Cervical back: Normal range of motion.   Neurological:      Mental Status: She is alert and oriented to person, place, and time.   Psychiatric:         Mood and Affect: Mood normal.         Behavior: Behavior normal.             Assessment and Plan    Problem List Items Addressed This Visit          Gravid and      (normal spontaneous vaginal delivery) - Primary    Overview     2024- baby boy Clyde Luong weighing 7 pounds 2 ounces.  39 weeks and 1 day            Mental Health    Anxiety and depression    Overview     Doing well on Zoloft and would like to continue.         Relevant Medications    sertraline (ZOLOFT) 50 MG " tablet     Other Visit Diagnoses       Postpartum follow-up        Encounter for initial prescription of contraceptive pills        Relevant Medications    norethindrone-ethinyl estradiol FE (Junel FE 1/20) 1-20 MG-MCG per tablet            S/p Vaginal delivery, 6 week(s) postpartum.  Doing well.    Return to normal physical activity.  No pelvic restrictions.   Baby doing well.  Bottlefeeding going well.  Anxiety and depression controlled on Zoloft  Contraception: contraceptive methods: OCP (estrogen/progesterone)  Return in about 1 year (around 3/21/2025) for Annual physical.     Shirin Ross MD  03/21/2024

## 2024-11-01 ENCOUNTER — TELEPHONE (OUTPATIENT)
Dept: OBSTETRICS AND GYNECOLOGY | Facility: CLINIC | Age: 34
End: 2024-11-01
Payer: COMMERCIAL

## 2024-11-01 NOTE — TELEPHONE ENCOUNTER
Pt called in requesting refill for medication :  sertraline (ZOLOFT) 50 MG tablet (02/17/2024)

## 2025-05-15 DIAGNOSIS — Z30.011 ENCOUNTER FOR INITIAL PRESCRIPTION OF CONTRACEPTIVE PILLS: ICD-10-CM

## 2025-05-15 RX ORDER — NORETHINDRONE ACETATE AND ETHINYL ESTRADIOL 1MG-20(21)
1 KIT ORAL DAILY
Qty: 28 TABLET | Refills: 12 | OUTPATIENT
Start: 2025-05-15